# Patient Record
Sex: FEMALE | Race: WHITE | NOT HISPANIC OR LATINO | Employment: UNEMPLOYED | URBAN - METROPOLITAN AREA
[De-identification: names, ages, dates, MRNs, and addresses within clinical notes are randomized per-mention and may not be internally consistent; named-entity substitution may affect disease eponyms.]

---

## 2018-07-17 ENCOUNTER — OFFICE VISIT (OUTPATIENT)
Dept: INTERNAL MEDICINE | Facility: CLINIC | Age: 43
End: 2018-07-17

## 2018-07-17 VITALS
HEART RATE: 87 BPM | TEMPERATURE: 98.3 F | WEIGHT: 157.6 LBS | DIASTOLIC BLOOD PRESSURE: 88 MMHG | HEIGHT: 65 IN | BODY MASS INDEX: 26.26 KG/M2 | SYSTOLIC BLOOD PRESSURE: 121 MMHG | RESPIRATION RATE: 19 BRPM

## 2018-07-17 DIAGNOSIS — F90.9 ATTENTION DEFICIT HYPERACTIVITY DISORDER (ADHD), UNSPECIFIED ADHD TYPE: Primary | ICD-10-CM

## 2018-07-17 DIAGNOSIS — Z13.220 SCREENING FOR LIPOID DISORDERS: ICD-10-CM

## 2018-07-17 DIAGNOSIS — Z13.89 ENCOUNTER FOR SCREENING FOR OTHER DISORDER: ICD-10-CM

## 2018-07-17 DIAGNOSIS — Z13.29 SCREENING FOR ENDOCRINE DISORDER: ICD-10-CM

## 2018-07-17 DIAGNOSIS — J30.9 ALLERGIC RHINITIS, UNSPECIFIED CHRONICITY, UNSPECIFIED SEASONALITY, UNSPECIFIED TRIGGER: ICD-10-CM

## 2018-07-17 DIAGNOSIS — K21.9 GASTROESOPHAGEAL REFLUX DISEASE, ESOPHAGITIS PRESENCE NOT SPECIFIED: ICD-10-CM

## 2018-07-17 DIAGNOSIS — I10 ESSENTIAL HYPERTENSION: ICD-10-CM

## 2018-07-17 PROCEDURE — 99204 OFFICE O/P NEW MOD 45 MIN: CPT | Performed by: NURSE PRACTITIONER

## 2018-07-17 RX ORDER — LISINOPRIL AND HYDROCHLOROTHIAZIDE 12.5; 1 MG/1; MG/1
1 TABLET ORAL DAILY
COMMUNITY
End: 2018-07-17 | Stop reason: SDUPTHER

## 2018-07-17 RX ORDER — LISINOPRIL AND HYDROCHLOROTHIAZIDE 12.5; 1 MG/1; MG/1
1 TABLET ORAL DAILY
Qty: 30 TABLET | Refills: 6 | Status: SHIPPED | OUTPATIENT
Start: 2018-07-17 | End: 2018-10-12 | Stop reason: SDUPTHER

## 2018-07-17 RX ORDER — OMEPRAZOLE 20 MG/1
20 CAPSULE, DELAYED RELEASE ORAL DAILY
COMMUNITY
Start: 2018-05-08 | End: 2018-09-14 | Stop reason: SDUPTHER

## 2018-07-17 RX ORDER — DEXTROAMPHETAMINE SACCHARATE, AMPHETAMINE ASPARTATE, DEXTROAMPHETAMINE SULFATE AND AMPHETAMINE SULFATE 5; 5; 5; 5 MG/1; MG/1; MG/1; MG/1
20 TABLET ORAL 2 TIMES DAILY
COMMUNITY
Start: 2018-04-28 | End: 2018-08-02 | Stop reason: SDUPTHER

## 2018-07-17 RX ORDER — MONTELUKAST SODIUM 10 MG/1
10 TABLET ORAL NIGHTLY
Qty: 30 TABLET | Refills: 6 | Status: SHIPPED | OUTPATIENT
Start: 2018-07-17 | End: 2018-09-14 | Stop reason: SDUPTHER

## 2018-07-17 RX ORDER — FLUTICASONE PROPIONATE 50 MCG
2 SPRAY, SUSPENSION (ML) NASAL DAILY
Qty: 1 BOTTLE | Refills: 6 | Status: SHIPPED | OUTPATIENT
Start: 2018-07-17 | End: 2018-09-14 | Stop reason: SDUPTHER

## 2018-07-17 RX ORDER — LISINOPRIL AND HYDROCHLOROTHIAZIDE 12.5; 1 MG/1; MG/1
10-12.5 TABLET ORAL DAILY
COMMUNITY
Start: 2018-07-11 | End: 2018-07-17

## 2018-07-17 NOTE — PROGRESS NOTES
Subjective:    Reyna Weiss is a 43 y.o. female.     Chief Complaint   Patient presents with   • Establish Care     medication refills        History of Present Illness   Patient here to establish care. Her spouse is in the  and they have just moved here from [place].    Patient complains of ADHD for at least 10 years. Patient cannot focus and this is relieved with this medication. She has a months supply and has seen the psychiatrist in the past for management and prefers to continue seeing a psychiatrist..     Patient complains of GERD since 1991. Associated with reflux and heartburn. Managed with medication. Worsened with sweet foods and processed foods. She has switched to a plant based diet about 18 months ago.    Patient complains of hypertension since 2000. No chest pain or shortness of breath. She has a history of intermittent peripheral edema relieved with this medication. Patient had hoped to stop taking medicine for hypertension since changing to a plant based diet, but she has not been able to do this.     Patient complains of allergic rhinitis. Associated with nasal congestion, sore in left nostril and post nasal drainage. Worsened with moving to [place] and exposure to pollen.      Current Outpatient Prescriptions:   •  lisinopril-hydrochlorothiazide (PRINZIDE,ZESTORETIC) 10-12.5 MG per tablet, Take 1 tablet by mouth Daily., Disp: 30 tablet, Rfl: 6  •  amphetamine-dextroamphetamine (ADDERALL) 20 MG tablet, Take 20 mg by mouth 2 (Two) Times a Day., Disp: , Rfl:   •  fluticasone (FLONASE ALLERGY RELIEF) 50 MCG/ACT nasal spray, 2 sprays into each nostril Daily., Disp: 1 bottle, Rfl: 6  •  montelukast (SINGULAIR) 10 MG tablet, Take 1 tablet by mouth Every Night., Disp: 30 tablet, Rfl: 6  •  omeprazole (priLOSEC) 20 MG capsule, Take 20 mg by mouth Daily., Disp: , Rfl:      The following portions of the patient's history were reviewed and updated as appropriate: allergies, current medications, past  "family history, past medical history, past social history, past surgical history and problem list.    Review of Systems   Constitutional: Negative for chills, fatigue and fever.   HENT: Positive for congestion and postnasal drip. Negative for dental problem, mouth sores, nosebleeds, rhinorrhea, sinus pain, sinus pressure, sneezing and sore throat.         Sore in left nostril. Complains of snoring.   Eyes: Negative for pain, discharge, redness and itching.   Respiratory: Negative for cough, shortness of breath and wheezing.    Cardiovascular: Negative for chest pain, palpitations and leg swelling.        Hypertension. No LUNA, orthopnea, PND, or claudication. Peripheral edema relieved with medication.   Gastrointestinal: Negative for abdominal distention, abdominal pain, blood in stool, diarrhea, nausea and vomiting.        GERD   Endocrine: Negative for cold intolerance, heat intolerance, polydipsia and polyuria.   Genitourinary: Negative for difficulty urinating, dysuria, frequency, hematuria and urgency.   Musculoskeletal: Negative for arthralgias, gait problem, joint swelling and myalgias.   Skin: Negative for color change, rash and wound.   Allergic/Immunologic: Positive for environmental allergies.   Neurological: Negative for dizziness, syncope, weakness, light-headedness, numbness and headaches.   Hematological: Negative for adenopathy. Does not bruise/bleed easily.   Psychiatric/Behavioral: Positive for decreased concentration.        ADHD       Objective:    /88 (BP Location: Right arm, Patient Position: Sitting)   Pulse 87   Temp 98.3 °F (36.8 °C)   Resp 19   Ht 165.1 cm (65\")   Wt 71.5 kg (157 lb 9.6 oz)   BMI 26.23 kg/m²     Physical Exam   Constitutional: She is oriented to person, place, and time. She appears well-developed and well-nourished. She is cooperative. She is easily aroused.  Non-toxic appearance. She does not have a sickly appearance. She does not appear ill. No distress. "   HENT:   Head: Normocephalic and atraumatic. Head is without abrasion. Hair is normal.   Right Ear: Hearing, tympanic membrane, external ear and ear canal normal. No foreign bodies. Tympanic membrane is not perforated and not erythematous.   Left Ear: Hearing, tympanic membrane, external ear and ear canal normal. No foreign bodies. Tympanic membrane is not perforated and not erythematous.   Nose: Mucosal edema and rhinorrhea present. No septal deviation. No epistaxis.  No foreign bodies.   Mouth/Throat: Oropharynx is clear and moist. No oral lesions. Normal dentition.   Left nasal mucosa inflamed   Eyes: Lids are normal. Pupils are equal, round, and reactive to light. Right eye exhibits no discharge. Left eye exhibits no discharge. Right conjunctiva is not injected. Left conjunctiva is not injected. No scleral icterus.   Neck: Normal range of motion and full passive range of motion without pain. Neck supple. No edema and normal range of motion present. No thyroid mass and no thyromegaly present.   Cardiovascular: Normal rate, regular rhythm and normal heart sounds.  Exam reveals no gallop and no friction rub.    No murmur heard.  Pulmonary/Chest: Effort normal and breath sounds normal. No accessory muscle usage. She has no rhonchi. She has no rales. She exhibits no tenderness.   Abdominal: Soft. Bowel sounds are normal. She exhibits no distension. There is no hepatosplenomegaly or hepatomegaly. There is no tenderness. There is no CVA tenderness.   Musculoskeletal: Normal range of motion. She exhibits no edema, tenderness or deformity.   Lymphadenopathy:     She has no cervical adenopathy.   Neurological: She is alert, oriented to person, place, and time and easily aroused. She has normal reflexes. No cranial nerve deficit. Coordination normal.   Muscle strength 5/5 and equal throughout.   Skin: Skin is warm, dry and intact. No abrasion and no rash noted. She is not diaphoretic. No cyanosis or erythema. Nails show  no clubbing.   Psychiatric: She has a normal mood and affect. Her speech is normal and behavior is normal.   Nursing note and vitals reviewed.      Assessment/Plan:    Reyna was seen today for establish care.    Diagnoses and all orders for this visit:    Attention deficit hyperactivity disorder (ADHD), unspecified ADHD type  -     Ambulatory Referral to Psychiatry    Allergic rhinitis, unspecified chronicity, unspecified seasonality, unspecified trigger  -     montelukast (SINGULAIR) 10 MG tablet; Take 1 tablet by mouth Every Night.  -     fluticasone (FLONASE ALLERGY RELIEF) 50 MCG/ACT nasal spray; 2 sprays into each nostril Daily.    Encounter for screening for other disorder    Screening for endocrine disorder  -     T4; Future  -     TSH; Future    Screening for lipoid disorders  -     Lipid Panel; Future    Essential hypertension  -     Comprehensive Metabolic Panel; Future  -     CBC & Differential; Future  -     MicroAlbumin, Urine, Random - Urine, Clean Catch; Future    Other orders  -     lisinopril-hydrochlorothiazide (PRINZIDE,ZESTORETIC) 10-12.5 MG per tablet; Take 1 tablet by mouth Daily.        Return if symptoms worsen or fail to improve, schedule physical.

## 2018-07-31 ENCOUNTER — TELEPHONE (OUTPATIENT)
Dept: INTERNAL MEDICINE | Facility: CLINIC | Age: 43
End: 2018-07-31

## 2018-07-31 NOTE — TELEPHONE ENCOUNTER
Her appointment is 8/22/2018 due to  insurance. Limited providers take this insurance. I saw her on 7/17/2018 and she had stated she had one month supply of ADHD medicine. I will need her to make an appointment so ADHD can be evaluated, UDS performed, CSA and Efe. I have sent in Zoloft to pharmacy.

## 2018-07-31 NOTE — TELEPHONE ENCOUNTER
----- Message from Sophie Balderrama sent at 7/30/2018  3:07 PM EDT -----  [phone]    PT NEEDS REFILL OF ADDERALL-SHE WAS REFERRED OUT TO PSYCH BUT HER APPT IS OUT A MONTH-SHE NEEDS REFILL NOW.  CAN YOU GET A DR TO SIGN OFF ON IT?  SHE DOES NOT KNOW WHAT ELSE TO DO    SHE WAS CRYING ON THE PHONE-SHE SAID SHE WANTS TO GO BACK ON ANTIDEPRESSANTS AND WANTED PSYCH TO PRESCRIBE THAT TOO-CAN YOU GIVE HER ANYTHING FOR THAT?  SHE WAS ON ZOLOFT OR PROZAC BEFORE.  OR CNA YOU GET INTO PSYCH SOONER?    [facility] [address]

## 2018-08-02 ENCOUNTER — OFFICE VISIT (OUTPATIENT)
Dept: INTERNAL MEDICINE | Facility: CLINIC | Age: 43
End: 2018-08-02

## 2018-08-02 VITALS
DIASTOLIC BLOOD PRESSURE: 84 MMHG | RESPIRATION RATE: 18 BRPM | TEMPERATURE: 97.8 F | HEIGHT: 65 IN | WEIGHT: 161.4 LBS | BODY MASS INDEX: 26.89 KG/M2 | SYSTOLIC BLOOD PRESSURE: 125 MMHG | HEART RATE: 73 BPM

## 2018-08-02 DIAGNOSIS — F32.A DEPRESSION, UNSPECIFIED DEPRESSION TYPE: ICD-10-CM

## 2018-08-02 DIAGNOSIS — F90.9 ATTENTION DEFICIT HYPERACTIVITY DISORDER (ADHD), UNSPECIFIED ADHD TYPE: Primary | ICD-10-CM

## 2018-08-02 PROCEDURE — 99214 OFFICE O/P EST MOD 30 MIN: CPT | Performed by: NURSE PRACTITIONER

## 2018-08-02 RX ORDER — DEXTROAMPHETAMINE SACCHARATE, AMPHETAMINE ASPARTATE, DEXTROAMPHETAMINE SULFATE AND AMPHETAMINE SULFATE 5; 5; 5; 5 MG/1; MG/1; MG/1; MG/1
20 TABLET ORAL 2 TIMES DAILY
Qty: 60 TABLET | Refills: 0 | Status: SHIPPED | OUTPATIENT
Start: 2018-08-02 | End: 2018-08-02 | Stop reason: SDUPTHER

## 2018-08-02 RX ORDER — DEXTROAMPHETAMINE SACCHARATE, AMPHETAMINE ASPARTATE, DEXTROAMPHETAMINE SULFATE AND AMPHETAMINE SULFATE 5; 5; 5; 5 MG/1; MG/1; MG/1; MG/1
20 TABLET ORAL 2 TIMES DAILY
Qty: 60 TABLET | Refills: 0 | Status: SHIPPED | OUTPATIENT
Start: 2018-08-02

## 2018-08-09 ENCOUNTER — TELEPHONE (OUTPATIENT)
Dept: INTERNAL MEDICINE | Facility: CLINIC | Age: 43
End: 2018-08-09

## 2018-08-09 NOTE — TELEPHONE ENCOUNTER
----- Message from STEFF Dasilva sent at 8/9/2018  8:14 AM EDT -----  Please inform patient urine drug screen positive for THC and adderall will not be prescribed any more.

## 2018-08-09 NOTE — TELEPHONE ENCOUNTER
Patient states that her urine tested positive for THC because she uses CBD oil. Patient wants to know if this makes difference.

## 2018-09-14 DIAGNOSIS — J30.9 ALLERGIC RHINITIS, UNSPECIFIED CHRONICITY, UNSPECIFIED SEASONALITY, UNSPECIFIED TRIGGER: ICD-10-CM

## 2018-09-14 DIAGNOSIS — K21.00 GASTROESOPHAGEAL REFLUX DISEASE WITH ESOPHAGITIS: Primary | ICD-10-CM

## 2018-09-14 RX ORDER — MONTELUKAST SODIUM 10 MG/1
10 TABLET ORAL NIGHTLY
Qty: 30 TABLET | Refills: 6 | Status: SHIPPED | OUTPATIENT
Start: 2018-09-14 | End: 2018-10-12 | Stop reason: SDUPTHER

## 2018-09-14 RX ORDER — FLUTICASONE PROPIONATE 50 MCG
2 SPRAY, SUSPENSION (ML) NASAL DAILY
Qty: 1 BOTTLE | Refills: 6 | Status: SHIPPED | OUTPATIENT
Start: 2018-09-14 | End: 2018-10-12 | Stop reason: SDUPTHER

## 2018-09-14 RX ORDER — OMEPRAZOLE 20 MG/1
20 CAPSULE, DELAYED RELEASE ORAL DAILY
Qty: 20 CAPSULE | Refills: 3 | Status: SHIPPED | OUTPATIENT
Start: 2018-09-14 | End: 2018-10-12 | Stop reason: SDUPTHER

## 2018-10-12 DIAGNOSIS — K21.00 GASTROESOPHAGEAL REFLUX DISEASE WITH ESOPHAGITIS: ICD-10-CM

## 2018-10-12 RX ORDER — LISINOPRIL AND HYDROCHLOROTHIAZIDE 12.5; 1 MG/1; MG/1
1 TABLET ORAL DAILY
Qty: 30 TABLET | Refills: 6 | Status: SHIPPED | OUTPATIENT
Start: 2018-10-12 | End: 2018-10-15 | Stop reason: SDUPTHER

## 2018-10-12 RX ORDER — MONTELUKAST SODIUM 10 MG/1
10 TABLET ORAL NIGHTLY
Qty: 30 TABLET | Refills: 6 | Status: SHIPPED | OUTPATIENT
Start: 2018-10-12 | End: 2018-10-15 | Stop reason: SDUPTHER

## 2018-10-12 RX ORDER — FLUTICASONE PROPIONATE 50 MCG
2 SPRAY, SUSPENSION (ML) NASAL DAILY
Qty: 1 BOTTLE | Refills: 6 | Status: SHIPPED | OUTPATIENT
Start: 2018-10-12 | End: 2018-10-15 | Stop reason: SDUPTHER

## 2018-10-12 RX ORDER — OMEPRAZOLE 20 MG/1
20 CAPSULE, DELAYED RELEASE ORAL DAILY
Qty: 20 CAPSULE | Refills: 3 | Status: SHIPPED | OUTPATIENT
Start: 2018-10-12 | End: 2018-10-15 | Stop reason: SDUPTHER

## 2018-10-15 DIAGNOSIS — K21.00 GASTROESOPHAGEAL REFLUX DISEASE WITH ESOPHAGITIS: ICD-10-CM

## 2018-10-15 RX ORDER — LISINOPRIL AND HYDROCHLOROTHIAZIDE 12.5; 1 MG/1; MG/1
1 TABLET ORAL DAILY
Qty: 30 TABLET | Refills: 6 | Status: SHIPPED | OUTPATIENT
Start: 2018-10-15 | End: 2019-01-04 | Stop reason: DRUGHIGH

## 2018-10-15 RX ORDER — OMEPRAZOLE 20 MG/1
20 CAPSULE, DELAYED RELEASE ORAL DAILY
Qty: 20 CAPSULE | Refills: 3 | Status: SHIPPED | OUTPATIENT
Start: 2018-10-15 | End: 2019-01-04 | Stop reason: SDUPTHER

## 2018-10-15 RX ORDER — MONTELUKAST SODIUM 10 MG/1
10 TABLET ORAL NIGHTLY
Qty: 30 TABLET | Refills: 6 | Status: SHIPPED | OUTPATIENT
Start: 2018-10-15

## 2018-10-15 RX ORDER — FLUTICASONE PROPIONATE 50 MCG
2 SPRAY, SUSPENSION (ML) NASAL DAILY
Qty: 1 BOTTLE | Refills: 6 | Status: SHIPPED | OUTPATIENT
Start: 2018-10-15 | End: 2020-09-21

## 2019-01-04 ENCOUNTER — OFFICE VISIT (OUTPATIENT)
Dept: INTERNAL MEDICINE | Facility: CLINIC | Age: 44
End: 2019-01-04

## 2019-01-04 VITALS
TEMPERATURE: 97.8 F | RESPIRATION RATE: 18 BRPM | BODY MASS INDEX: 28.32 KG/M2 | HEART RATE: 74 BPM | DIASTOLIC BLOOD PRESSURE: 98 MMHG | SYSTOLIC BLOOD PRESSURE: 140 MMHG | WEIGHT: 170 LBS | HEIGHT: 65 IN

## 2019-01-04 DIAGNOSIS — K21.00 GASTROESOPHAGEAL REFLUX DISEASE WITH ESOPHAGITIS: ICD-10-CM

## 2019-01-04 DIAGNOSIS — G56.03 BILATERAL CARPAL TUNNEL SYNDROME: ICD-10-CM

## 2019-01-04 DIAGNOSIS — I10 ESSENTIAL HYPERTENSION: Primary | ICD-10-CM

## 2019-01-04 DIAGNOSIS — N95.1 SYMPTOMS, SUCH AS FLUSHING, SLEEPLESSNESS, HEADACHE, LACK OF CONCENTRATION, ASSOCIATED WITH THE MENOPAUSE: ICD-10-CM

## 2019-01-04 LAB
ALBUMIN SERPL-MCNC: 4.25 G/DL (ref 3.2–4.8)
ALBUMIN/GLOB SERPL: 2 G/DL (ref 1.5–2.5)
ALP SERPL-CCNC: 57 U/L (ref 25–100)
ALT SERPL W P-5'-P-CCNC: 17 U/L (ref 7–40)
ANION GAP SERPL CALCULATED.3IONS-SCNC: 5 MMOL/L (ref 3–11)
AST SERPL-CCNC: 18 U/L (ref 0–33)
BILIRUB SERPL-MCNC: 0.3 MG/DL (ref 0.3–1.2)
BUN BLD-MCNC: 11 MG/DL (ref 9–23)
BUN/CREAT SERPL: 16.9 (ref 7–25)
CALCIUM SPEC-SCNC: 9.3 MG/DL (ref 8.7–10.4)
CHLORIDE SERPL-SCNC: 106 MMOL/L (ref 99–109)
CO2 SERPL-SCNC: 27 MMOL/L (ref 20–31)
CREAT BLD-MCNC: 0.65 MG/DL (ref 0.6–1.3)
FSH SERPL-ACNC: 4.7 MIU/ML
GFR SERPL CREATININE-BSD FRML MDRD: 99 ML/MIN/1.73
GLOBULIN UR ELPH-MCNC: 2.2 GM/DL
GLUCOSE BLD-MCNC: 106 MG/DL (ref 70–100)
POTASSIUM BLD-SCNC: 4.3 MMOL/L (ref 3.5–5.5)
PROT SERPL-MCNC: 6.4 G/DL (ref 5.7–8.2)
SODIUM BLD-SCNC: 138 MMOL/L (ref 132–146)
T4 FREE SERPL-MCNC: 1.12 NG/DL (ref 0.89–1.76)
TSH SERPL DL<=0.05 MIU/L-ACNC: 1.35 MIU/ML (ref 0.35–5.35)

## 2019-01-04 PROCEDURE — 83001 ASSAY OF GONADOTROPIN (FSH): CPT | Performed by: NURSE PRACTITIONER

## 2019-01-04 PROCEDURE — 99214 OFFICE O/P EST MOD 30 MIN: CPT | Performed by: NURSE PRACTITIONER

## 2019-01-04 PROCEDURE — 80053 COMPREHEN METABOLIC PANEL: CPT | Performed by: NURSE PRACTITIONER

## 2019-01-04 PROCEDURE — 36415 COLL VENOUS BLD VENIPUNCTURE: CPT | Performed by: NURSE PRACTITIONER

## 2019-01-04 PROCEDURE — 84443 ASSAY THYROID STIM HORMONE: CPT | Performed by: NURSE PRACTITIONER

## 2019-01-04 PROCEDURE — 84439 ASSAY OF FREE THYROXINE: CPT | Performed by: NURSE PRACTITIONER

## 2019-01-04 RX ORDER — OMEPRAZOLE 20 MG/1
20 CAPSULE, DELAYED RELEASE ORAL DAILY
Qty: 30 CAPSULE | Refills: 3 | Status: SHIPPED | OUTPATIENT
Start: 2019-01-04 | End: 2019-01-04 | Stop reason: DRUGHIGH

## 2019-01-04 RX ORDER — OMEPRAZOLE 40 MG/1
40 CAPSULE, DELAYED RELEASE ORAL DAILY
Qty: 30 CAPSULE | Refills: 3 | Status: SHIPPED | OUTPATIENT
Start: 2019-01-04 | End: 2019-06-22 | Stop reason: SDUPTHER

## 2019-01-04 RX ORDER — LISINOPRIL AND HYDROCHLOROTHIAZIDE 20; 12.5 MG/1; MG/1
1 TABLET ORAL DAILY
Qty: 90 TABLET | Refills: 1 | Status: SHIPPED | OUTPATIENT
Start: 2019-01-04 | End: 2019-08-13 | Stop reason: SDUPTHER

## 2019-01-04 NOTE — PROGRESS NOTES
Subjective:    Reyna Weiss is a 43 y.o. female.     Chief Complaint   Patient presents with   • Carpal Tunnel     right xseveral years   • Numbness     right thumb x 3 months        History of Present Illness     Patient complains of carpal tunnel pain bilaterally for several years. Worse on right hand-first 3 digits are numb constantly x 3 months. No relief with brace x 2 years. Patient interested in surgical referral at this time. No alleviation identified. Worsened with use.    Patient complains of hypertension since age 15. Worsened with recent weight gain. She checks blood pressure randomly at home and diastolic has been in 90 range. Patient feels like Zoloft has caused her weight gain. No chest pain, shortness of breath or leg swelling.    She is seeing a psychiatrist every month for ADHD and depression. She states psychiatrist may be changing medication due to weight gain.    Patient complains of irregular menses, hot flashes and vaginal dryness for 2 months. Patient inquiring about menopause. Patient does not know mother's menstrual history due to surgery.    Patient complains of GERD for years of duration. Worsened recently and patient requesting gastroenterology referral.    Current Outpatient Medications:   •  amphetamine-dextroamphetamine (ADDERALL) 20 MG tablet, Take 1 tablet by mouth 2 (Two) Times a Day., Disp: 60 tablet, Rfl: 0  •  fluticasone (FLONASE ALLERGY RELIEF) 50 MCG/ACT nasal spray, 2 sprays into the nostril(s) as directed by provider Daily., Disp: 1 bottle, Rfl: 6  •  montelukast (SINGULAIR) 10 MG tablet, Take 1 tablet by mouth Every Night., Disp: 30 tablet, Rfl: 6  •  sertraline (ZOLOFT) 50 MG tablet, Take 1 tablet by mouth Daily. (Patient taking differently: Take 100 mg by mouth Daily.), Disp: 30 tablet, Rfl: 0  •  lisinopril-hydrochlorothiazide (PRINZIDE,ZESTORETIC) 20-12.5 MG per tablet, Take 1 tablet by mouth Daily., Disp: 90 tablet, Rfl: 1  •  omeprazole (PRILOSEC) 40 MG capsule,  "Take 1 capsule by mouth Daily., Disp: 30 capsule, Rfl: 3     The following portions of the patient's history were reviewed and updated as appropriate: allergies, current medications, past family history, past medical history, past social history, past surgical history and problem list.    Review of Systems   Constitutional: Negative for chills, fatigue and fever.   HENT: Negative for congestion, dental problem, mouth sores, nosebleeds, postnasal drip, rhinorrhea, sinus pressure, sinus pain, sneezing and sore throat.    Eyes: Negative for pain, discharge, redness and itching.   Respiratory: Negative for cough, shortness of breath and wheezing.    Cardiovascular: Negative for chest pain, palpitations and leg swelling.        No LUNA, orthopnea, PND, or claudication. HTN   Gastrointestinal: Negative for abdominal distention, abdominal pain, blood in stool, diarrhea, nausea and vomiting.        GERD   Endocrine: Negative for cold intolerance, heat intolerance, polydipsia and polyuria.   Genitourinary: Positive for menstrual problem. Negative for difficulty urinating, dysuria, frequency, hematuria and urgency.        Vaginal dryness, hot flashes, irregular menses   Musculoskeletal: Negative for arthralgias, gait problem, joint swelling and myalgias.        Carpal tunnel bilaterally R>L   Skin: Negative for color change, rash and wound.   Allergic/Immunologic: Positive for environmental allergies.   Neurological: Negative for dizziness, syncope, weakness, light-headedness, numbness and headaches.   Hematological: Negative for adenopathy. Does not bruise/bleed easily.   Psychiatric/Behavioral: Positive for decreased concentration and dysphoric mood. Negative for self-injury and suicidal ideas.        ADHD. Depression       Objective:    /98 (BP Location: Right arm, Patient Position: Sitting, Cuff Size: Adult)   Pulse 74   Temp 97.8 °F (36.6 °C)   Resp 18   Ht 165.1 cm (65\")   Wt 77.1 kg (170 lb)   BMI 28.29 " kg/m²     Physical Exam   Constitutional: She is oriented to person, place, and time. She appears well-developed and well-nourished. She is cooperative. She is easily aroused.  Non-toxic appearance. She does not have a sickly appearance. She does not appear ill. No distress.   HENT:   Head: Normocephalic and atraumatic. Head is without abrasion. Hair is normal.   Right Ear: Tympanic membrane, external ear and ear canal normal. No foreign bodies. Tympanic membrane is not perforated and not erythematous.   Left Ear: Tympanic membrane, external ear and ear canal normal. No foreign bodies. Tympanic membrane is not perforated and not erythematous.   Nose: Nose normal. No mucosal edema, rhinorrhea or septal deviation. No epistaxis.  No foreign bodies.   Mouth/Throat: Oropharynx is clear and moist. No oral lesions. Normal dentition.   Eyes: Conjunctivae and lids are normal. Pupils are equal, round, and reactive to light. Right eye exhibits no discharge. Left eye exhibits no discharge. Right conjunctiva is not injected. Left conjunctiva is not injected. No scleral icterus.   Neck: Normal range of motion and full passive range of motion without pain. Neck supple. No edema and normal range of motion present. No thyroid mass and no thyromegaly present.   Cardiovascular: Normal rate, regular rhythm and normal heart sounds. Exam reveals no gallop and no friction rub.   No murmur heard.  Pulmonary/Chest: Effort normal and breath sounds normal. No accessory muscle usage. She has no rhonchi. She has no rales. She exhibits no tenderness.   Abdominal: Soft. Bowel sounds are normal. She exhibits no distension. There is no hepatosplenomegaly or hepatomegaly. There is no tenderness. There is no CVA tenderness.   Musculoskeletal: She exhibits no edema or deformity.        Right wrist: She exhibits decreased range of motion and tenderness.        Left wrist: She exhibits decreased range of motion and tenderness.   Lymphadenopathy:     She  has no cervical adenopathy.   Neurological: She is alert, oriented to person, place, and time and easily aroused. Coordination normal.   Skin: Skin is warm, dry and intact. No abrasion and no rash noted. She is not diaphoretic. No cyanosis or erythema. Nails show no clubbing.   Psychiatric: Her mood appears anxious.   Nursing note and vitals reviewed.      Assessment/Plan:    Reyna was seen today for carpal tunnel and numbness.    Diagnoses and all orders for this visit:    Essential hypertension  -     lisinopril-hydrochlorothiazide (PRINZIDE,ZESTORETIC) 20-12.5 MG per tablet; Take 1 tablet by mouth Daily.  -     Comprehensive Metabolic Panel    Gastroesophageal reflux disease with esophagitis  -     Discontinue: omeprazole (priLOSEC) 20 MG capsule; Take 1 capsule by mouth Daily.  -     Ambulatory Referral to Gastroenterology  -     omeprazole (PRILOSEC) 40 MG capsule; Take 1 capsule by mouth Daily.    Symptoms, such as flushing, sleeplessness, headache, lack of concentration, associated with the menopause  -     T4, Free  -     TSH  -     Follicle Stimulating Hormone    Bilateral carpal tunnel syndrome  -     Ambulatory Referral to Hand Surgery        Return if symptoms worsen or fail to improve, schedule physical f/u 1 month.

## 2019-01-07 ENCOUNTER — TELEPHONE (OUTPATIENT)
Dept: INTERNAL MEDICINE | Facility: CLINIC | Age: 44
End: 2019-01-07

## 2019-01-07 NOTE — TELEPHONE ENCOUNTER
----- Message from STEFF Dasilva sent at 1/4/2019  3:34 PM EST -----  Please inform patient thyroid labs are normal. FSH does not indicate menopause, but she may be experiencing perimenopause. Metabolic panel stable with elevated glucose and can be related to a non fasting state.

## 2019-06-22 DIAGNOSIS — K21.00 GASTROESOPHAGEAL REFLUX DISEASE WITH ESOPHAGITIS: ICD-10-CM

## 2019-06-24 RX ORDER — OMEPRAZOLE 40 MG/1
CAPSULE, DELAYED RELEASE ORAL
Qty: 30 CAPSULE | Refills: 0 | Status: SHIPPED | OUTPATIENT
Start: 2019-06-24 | End: 2019-07-29 | Stop reason: SDUPTHER

## 2019-07-29 DIAGNOSIS — K21.00 GASTROESOPHAGEAL REFLUX DISEASE WITH ESOPHAGITIS: ICD-10-CM

## 2019-07-29 RX ORDER — OMEPRAZOLE 40 MG/1
CAPSULE, DELAYED RELEASE ORAL
Qty: 30 CAPSULE | Refills: 0 | Status: SHIPPED | OUTPATIENT
Start: 2019-07-29 | End: 2019-08-06

## 2019-08-05 NOTE — PROGRESS NOTES
Chief Complaint   Patient presents with   • Annual Exam     fasting       Subjective   Physical    History of Present Illness   Physical    Patient complains of chronic hypertension since age 15. Improved with recent weight loss after stopping Zoloft. Patient denies chest pain, palpitations, LUNA, orthopnea or swelling.      She is seeing a psychiatrist every month for chronic ADHD and depression. She states she has decided to stop Zoloft with psychiatrist awareness due to weight gain as side effect. Noted with medical record review that Borderline Personality disorder is noted in history of diagnoses.     Patient complains of chronic GERD for years of duration. Worsened recently and patient requesting gastroenterology referral. Patient requests change from 40 mg daily dosage to 20 mg BID dosage so she can wean off this medicine, but she will discuss with gastro. Patient states she is due colonoscopy as well.     Patient complains of chronic intermittent numbness on right side of body. She believes this is related to cervical DDD. Numbness radiates from neck to finger tips.    She complains of palpable lump at right breast at 11:00 x 2 years and new pain at 3:00 at right areola-she refuses any mammogram. Discussed mammogram may uncover breast cancer and patient states mammogram is not a definitive test.     Patient complains of chronic right sided back pain with sciatica. This is worsened with PMS, prolonged sitting and exercise. No treatment with analgesics or other medicines as she does not want to take many medicines.    She complains of headaches once a month for 3 days at base of neck that radiates tingly sensation on right side of head and face. No light or sound sensitivity, but she does get nausea without vomiting. Certain head position give relief, but she cannot maintain these positions. She states she has always had these headaches.    Chronic allergic rhinitis associated with nasal congestion is stable.  Worsened by season changes. Managed with SInulair and FLonase    Patient complains of chronic snoring with daytime fatigue, no matter amount of sleep. She is interested in sleep study.   Reyna Weiss is a 44 y.o. female.   None  Are you currently seeing any other doctors or specialists?  Psychiatrist  Are you currently taking any OTC medications or herbal medications?   None  Sleep: 6-7 hours  Diet: balanced  Exercise: yes    Most recent colonoscopy: 2015 and is on schedule with gastro  Most recent mammogram: does not do by choice-pain in right breast for years, she will agree to US  Most recent pap smear: yes-due in 2020  First day of last menses: 7/24/2019    Regular dental visits: current  Regular eye exams: current    The following portions of the patient's history were reviewed and updated as appropriate: allergies, current medications, past family history, past medical history, past social history, past surgical history and problem list.    No Known Allergies  Social History     Tobacco Use   • Smoking status: Never Smoker   • Smokeless tobacco: Never Used   Substance Use Topics   • Alcohol use: No     History reviewed. No pertinent surgical history.  History reviewed. No pertinent family history.      Current Outpatient Medications:   •  amphetamine-dextroamphetamine (ADDERALL) 20 MG tablet, Take 1 tablet by mouth 2 (Two) Times a Day. (Patient taking differently: Take 20 mg by mouth Daily.), Disp: 60 tablet, Rfl: 0  •  amphetamine-dextroamphetamine XR (ADDERALL XR) 20 MG 24 hr capsule, Take 20 mg by mouth Every Morning, Disp: , Rfl: 0  •  fluticasone (FLONASE ALLERGY RELIEF) 50 MCG/ACT nasal spray, 2 sprays into the nostril(s) as directed by provider Daily., Disp: 1 bottle, Rfl: 6  •  lisinopril-hydrochlorothiazide (PRINZIDE,ZESTORETIC) 20-12.5 MG per tablet, Take 1 tablet by mouth Daily., Disp: 90 tablet, Rfl: 1  •  montelukast (SINGULAIR) 10 MG tablet, Take 1 tablet by mouth Every Night., Disp: 30 tablet,  Rfl: 6  •  omeprazole (PRILOSEC) 20 MG capsule, Take 1 capsule by mouth 2 (Two) Times a Day., Disp: 60 capsule, Rfl: 6  •  SUMAtriptan (IMITREX) 25 MG tablet, Take one tablet at onset of headache. May repeat dose one time in 2 hours if headache not relieved., Disp: 9 tablet, Rfl: 2    Patient Active Problem List   Diagnosis   • Attention deficit hyperactivity disorder (ADHD)   • Allergic rhinitis   • Essential hypertension   • Gastroesophageal reflux disease   • Depression   • Snoring   • Numbness   • Nonintractable episodic headache   • Right sided sciatica   • Breast pain   • Breast lump on right side at 11 o'clock position       Review of Systems   Constitutional: Negative for chills, fatigue and fever.   HENT: Positive for congestion. Negative for dental problem, mouth sores, nosebleeds, postnasal drip, rhinorrhea, sinus pressure, sneezing and sore throat.         Snoring lightly   Eyes: Negative for pain, discharge, redness and itching.   Respiratory: Negative for cough, shortness of breath and wheezing.    Cardiovascular: Negative for chest pain, palpitations and leg swelling.        No LUNA, orthopnea, PND, or claudication.   Gastrointestinal: Negative for abdominal distention, abdominal pain, blood in stool, diarrhea, nausea and vomiting.   Endocrine: Negative for cold intolerance, heat intolerance, polydipsia and polyuria.   Genitourinary: Positive for breast lump and breast pain. Negative for breast discharge, urinary incontinence, decreased libido, decreased urine volume, difficulty urinating, dyspareunia, dysuria, flank pain, frequency, genital sores, hematuria, menstrual problem, pelvic pain, urgency, vaginal bleeding, vaginal discharge and vaginal pain.        Right breast pain   Musculoskeletal: Positive for back pain and neck pain. Negative for arthralgias, gait problem, joint swelling and myalgias.   Skin: Negative for color change and rash.        No wound.   Allergic/Immunologic: Positive for  environmental allergies.   Neurological: Positive for numbness and headache. Negative for dizziness, syncope, weakness and light-headedness.   Hematological: Negative for adenopathy. Does not bruise/bleed easily.       Objective   Vitals:    08/06/19 0930   BP: 138/70   Pulse: 78   Resp: 20   Temp: 98.3 °F (36.8 °C)   SpO2: 98%     Physical Exam   Constitutional: She is oriented to person, place, and time. She appears well-developed and well-nourished. She is active and cooperative. She is easily aroused.  Non-toxic appearance. She does not have a sickly appearance. She does not appear ill. No distress. She appears overweight. She is not obese.She is not morbidly obese.  HENT:   Head: Normocephalic and atraumatic. Head is without abrasion. Hair is normal.   Right Ear: Hearing, tympanic membrane, external ear and ear canal normal. No foreign bodies. Tympanic membrane is not perforated and not erythematous.   Left Ear: Hearing, tympanic membrane, external ear and ear canal normal. No foreign bodies. Tympanic membrane is not perforated and not erythematous.   Nose: Nose normal. No mucosal edema, rhinorrhea or septal deviation. No epistaxis.  No foreign bodies.   Mouth/Throat: Uvula is midline and oropharynx is clear and moist. No oral lesions. Normal dentition.   Eyes: Conjunctivae and lids are normal. Pupils are equal, round, and reactive to light. Right eye exhibits no discharge. Left eye exhibits no discharge. Right conjunctiva is not injected. Left conjunctiva is not injected. No scleral icterus.   Neck: Normal range of motion and full passive range of motion without pain. Neck supple. No edema and normal range of motion present. No thyroid mass and no thyromegaly present.   Cardiovascular: Normal rate, regular rhythm and normal heart sounds. Exam reveals no gallop and no friction rub.   No murmur heard.  Pulmonary/Chest: Effort normal and breath sounds normal. No accessory muscle usage. She has no rhonchi. She has  no rales. Chest wall is not dull to percussion. She exhibits no mass, no tenderness, no bony tenderness, no laceration, no crepitus, no edema, no deformity, no swelling and no retraction. Right breast exhibits mass and tenderness. Right breast exhibits no inverted nipple, no nipple discharge and no skin change. Left breast exhibits no inverted nipple, no mass, no nipple discharge, no skin change and no tenderness. Breasts are symmetrical. There is no breast swelling.   0.5 cm palpable lump at 11:00 right breast and tenderness at that lump and at 3:00 at areola     Abdominal: Soft. Bowel sounds are normal. She exhibits no distension. There is no hepatosplenomegaly or hepatomegaly. There is no tenderness. There is no CVA tenderness.   Musculoskeletal: Normal range of motion. She exhibits no edema, tenderness or deformity.   Lymphadenopathy:     She has no cervical adenopathy.   Neurological: She is alert, oriented to person, place, and time and easily aroused. She has normal reflexes. No cranial nerve deficit. Coordination normal.   Muscle strength 5/5 and equal throughout.   Skin: Skin is warm, dry and intact. No abrasion and no rash noted. She is not diaphoretic. No cyanosis or erythema. Nails show no clubbing.   Psychiatric: She has a normal mood and affect. Her speech is normal and behavior is normal.   Nursing note and vitals reviewed.        Results for orders placed or performed in visit on 01/04/19   T4, Free   Result Value Ref Range    Free T4 1.12 0.89 - 1.76 ng/dL   TSH   Result Value Ref Range    TSH 1.349 0.350 - 5.350 mIU/mL   Comprehensive Metabolic Panel   Result Value Ref Range    Glucose 106 (H) 70 - 100 mg/dL    BUN 11 9 - 23 mg/dL    Creatinine 0.65 0.60 - 1.30 mg/dL    Sodium 138 132 - 146 mmol/L    Potassium 4.3 3.5 - 5.5 mmol/L    Chloride 106 99 - 109 mmol/L    CO2 27.0 20.0 - 31.0 mmol/L    Calcium 9.3 8.7 - 10.4 mg/dL    Total Protein 6.4 5.7 - 8.2 g/dL    Albumin 4.25 3.20 - 4.80 g/dL    ALT  (SGPT) 17 7 - 40 U/L    AST (SGOT) 18 0 - 33 U/L    Alkaline Phosphatase 57 25 - 100 U/L    Total Bilirubin 0.3 0.3 - 1.2 mg/dL    eGFR Non African Amer 99 >60 mL/min/1.73    Globulin 2.2 gm/dL    A/G Ratio 2.0 1.5 - 2.5 g/dL    BUN/Creatinine Ratio 16.9 7.0 - 25.0    Anion Gap 5.0 3.0 - 11.0 mmol/L   Follicle Stimulating Hormone   Result Value Ref Range    FSH 4.70 mIU/mL       Assessment/Plan   Reyna was seen today for annual exam.    Diagnoses and all orders for this visit:    Encounter for preventive health examination    Screen for colon cancer  -     Ambulatory Referral to Gastroenterology    Gastroesophageal reflux disease with esophagitis  -     omeprazole (PRILOSEC) 20 MG capsule; Take 1 capsule by mouth 2 (Two) Times a Day.    Snoring  -     Ambulatory Referral to Sleep Medicine    Numbness  -     Ambulatory Referral to Neurology    Nonintractable episodic headache, unspecified headache type  -     SUMAtriptan (IMITREX) 25 MG tablet; Take one tablet at onset of headache. May repeat dose one time in 2 hours if headache not relieved.    Encounter for screening for other disorder  -     T4, Free  -     TSH  -     CBC & Differential  -     Comprehensive Metabolic Panel  -     Lipid Panel  -     CBC Auto Differential    Right sided sciatica  -     Ambulatory Referral to Orthopedic Surgery    Breast pain    Breast lump on right side at 11 o'clock position  -     US Breast Right Limited    Essential hypertension  Continue Zestoretic  Allergic rhinitis, unspecified seasonality, unspecified trigger  Continue Flonase and Singulair    ADHD/depression-continue psychiatric follow and medicines per their recommendation           Patient education discussed during this visit:  - avoidance of texting while driving and the need for wearing seatbelt  - use of sunscreen  - healthy sleep habits and appropriate amount of sleep  - H2O consumption, well-balanced diet  - exercise routine which includes at least 150 minutes of  cardio per week + muscle strengthening exercises  - immunizations including annual flu vaccination      Return in about 1 year (around 8/6/2020), or if symptoms worsen or fail to improve.

## 2019-08-06 ENCOUNTER — TRANSCRIBE ORDERS (OUTPATIENT)
Dept: INTERNAL MEDICINE | Facility: CLINIC | Age: 44
End: 2019-08-06

## 2019-08-06 ENCOUNTER — OFFICE VISIT (OUTPATIENT)
Dept: INTERNAL MEDICINE | Facility: CLINIC | Age: 44
End: 2019-08-06

## 2019-08-06 VITALS
SYSTOLIC BLOOD PRESSURE: 138 MMHG | TEMPERATURE: 98.3 F | OXYGEN SATURATION: 98 % | HEART RATE: 78 BPM | HEIGHT: 65 IN | WEIGHT: 159 LBS | BODY MASS INDEX: 26.49 KG/M2 | DIASTOLIC BLOOD PRESSURE: 70 MMHG | RESPIRATION RATE: 20 BRPM

## 2019-08-06 DIAGNOSIS — R51.9 NONINTRACTABLE EPISODIC HEADACHE, UNSPECIFIED HEADACHE TYPE: ICD-10-CM

## 2019-08-06 DIAGNOSIS — J30.9 ALLERGIC RHINITIS, UNSPECIFIED SEASONALITY, UNSPECIFIED TRIGGER: ICD-10-CM

## 2019-08-06 DIAGNOSIS — F32.A DEPRESSION, UNSPECIFIED DEPRESSION TYPE: ICD-10-CM

## 2019-08-06 DIAGNOSIS — I10 ESSENTIAL HYPERTENSION: ICD-10-CM

## 2019-08-06 DIAGNOSIS — R06.83 SNORING: ICD-10-CM

## 2019-08-06 DIAGNOSIS — R20.0 NUMBNESS: ICD-10-CM

## 2019-08-06 DIAGNOSIS — Z00.00 ENCOUNTER FOR PREVENTIVE HEALTH EXAMINATION: Primary | ICD-10-CM

## 2019-08-06 DIAGNOSIS — Z12.11 SCREEN FOR COLON CANCER: ICD-10-CM

## 2019-08-06 DIAGNOSIS — F90.9 ATTENTION DEFICIT HYPERACTIVITY DISORDER (ADHD), UNSPECIFIED ADHD TYPE: ICD-10-CM

## 2019-08-06 DIAGNOSIS — N63.11 BREAST LUMP ON RIGHT SIDE AT 11 O'CLOCK POSITION: Primary | ICD-10-CM

## 2019-08-06 DIAGNOSIS — N63.11 BREAST LUMP ON RIGHT SIDE AT 11 O'CLOCK POSITION: ICD-10-CM

## 2019-08-06 DIAGNOSIS — N64.4 BREAST PAIN: ICD-10-CM

## 2019-08-06 DIAGNOSIS — M54.31 RIGHT SIDED SCIATICA: ICD-10-CM

## 2019-08-06 DIAGNOSIS — K21.00 GASTROESOPHAGEAL REFLUX DISEASE WITH ESOPHAGITIS: ICD-10-CM

## 2019-08-06 DIAGNOSIS — Z13.89 ENCOUNTER FOR SCREENING FOR OTHER DISORDER: ICD-10-CM

## 2019-08-06 LAB
ALBUMIN SERPL-MCNC: 4.3 G/DL (ref 3.5–5.2)
ALBUMIN/GLOB SERPL: 1.4 G/DL
ALP SERPL-CCNC: 54 U/L (ref 39–117)
ALT SERPL W P-5'-P-CCNC: 8 U/L (ref 1–33)
ANION GAP SERPL CALCULATED.3IONS-SCNC: 10.6 MMOL/L (ref 5–15)
AST SERPL-CCNC: 14 U/L (ref 1–32)
BASOPHILS # BLD AUTO: 0.04 10*3/MM3 (ref 0–0.2)
BASOPHILS NFR BLD AUTO: 0.5 % (ref 0–1.5)
BILIRUB SERPL-MCNC: 0.6 MG/DL (ref 0.2–1.2)
BUN BLD-MCNC: 6 MG/DL (ref 6–20)
BUN/CREAT SERPL: 8.2 (ref 7–25)
CALCIUM SPEC-SCNC: 9.9 MG/DL (ref 8.6–10.5)
CHLORIDE SERPL-SCNC: 104 MMOL/L (ref 98–107)
CHOLEST SERPL-MCNC: 140 MG/DL (ref 0–200)
CO2 SERPL-SCNC: 28.4 MMOL/L (ref 22–29)
CREAT BLD-MCNC: 0.73 MG/DL (ref 0.57–1)
DEPRECATED RDW RBC AUTO: 46.3 FL (ref 37–54)
EOSINOPHIL # BLD AUTO: 0.31 10*3/MM3 (ref 0–0.4)
EOSINOPHIL NFR BLD AUTO: 3.9 % (ref 0.3–6.2)
ERYTHROCYTE [DISTWIDTH] IN BLOOD BY AUTOMATED COUNT: 13 % (ref 12.3–15.4)
GFR SERPL CREATININE-BSD FRML MDRD: 86 ML/MIN/1.73
GLOBULIN UR ELPH-MCNC: 3 GM/DL
GLUCOSE BLD-MCNC: 96 MG/DL (ref 65–99)
HCT VFR BLD AUTO: 46.8 % (ref 34–46.6)
HDLC SERPL-MCNC: 46 MG/DL (ref 40–60)
HGB BLD-MCNC: 14.9 G/DL (ref 12–15.9)
IMM GRANULOCYTES # BLD AUTO: 0.02 10*3/MM3 (ref 0–0.05)
IMM GRANULOCYTES NFR BLD AUTO: 0.3 % (ref 0–0.5)
LDLC SERPL CALC-MCNC: 78 MG/DL (ref 0–100)
LDLC/HDLC SERPL: 1.69 {RATIO}
LYMPHOCYTES # BLD AUTO: 2 10*3/MM3 (ref 0.7–3.1)
LYMPHOCYTES NFR BLD AUTO: 25 % (ref 19.6–45.3)
MCH RBC QN AUTO: 31.1 PG (ref 26.6–33)
MCHC RBC AUTO-ENTMCNC: 31.8 G/DL (ref 31.5–35.7)
MCV RBC AUTO: 97.7 FL (ref 79–97)
MONOCYTES # BLD AUTO: 0.7 10*3/MM3 (ref 0.1–0.9)
MONOCYTES NFR BLD AUTO: 8.8 % (ref 5–12)
NEUTROPHILS # BLD AUTO: 4.93 10*3/MM3 (ref 1.7–7)
NEUTROPHILS NFR BLD AUTO: 61.5 % (ref 42.7–76)
NRBC BLD AUTO-RTO: 0 /100 WBC (ref 0–0.2)
PLATELET # BLD AUTO: 276 10*3/MM3 (ref 140–450)
PMV BLD AUTO: 11 FL (ref 6–12)
POTASSIUM BLD-SCNC: 4.9 MMOL/L (ref 3.5–5.2)
PROT SERPL-MCNC: 7.3 G/DL (ref 6–8.5)
RBC # BLD AUTO: 4.79 10*6/MM3 (ref 3.77–5.28)
SODIUM BLD-SCNC: 143 MMOL/L (ref 136–145)
T4 FREE SERPL-MCNC: 1.16 NG/DL (ref 0.93–1.7)
TRIGL SERPL-MCNC: 81 MG/DL (ref 0–150)
TSH SERPL DL<=0.05 MIU/L-ACNC: 1 MIU/ML (ref 0.27–4.2)
VLDLC SERPL-MCNC: 16.2 MG/DL (ref 5–40)
WBC NRBC COR # BLD: 8 10*3/MM3 (ref 3.4–10.8)

## 2019-08-06 PROCEDURE — 80053 COMPREHEN METABOLIC PANEL: CPT | Performed by: NURSE PRACTITIONER

## 2019-08-06 PROCEDURE — 85025 COMPLETE CBC W/AUTO DIFF WBC: CPT | Performed by: NURSE PRACTITIONER

## 2019-08-06 PROCEDURE — 99214 OFFICE O/P EST MOD 30 MIN: CPT | Performed by: NURSE PRACTITIONER

## 2019-08-06 PROCEDURE — 84439 ASSAY OF FREE THYROXINE: CPT | Performed by: NURSE PRACTITIONER

## 2019-08-06 PROCEDURE — 36415 COLL VENOUS BLD VENIPUNCTURE: CPT | Performed by: NURSE PRACTITIONER

## 2019-08-06 PROCEDURE — 80061 LIPID PANEL: CPT | Performed by: NURSE PRACTITIONER

## 2019-08-06 PROCEDURE — 84443 ASSAY THYROID STIM HORMONE: CPT | Performed by: NURSE PRACTITIONER

## 2019-08-06 RX ORDER — SUMATRIPTAN 25 MG/1
TABLET, FILM COATED ORAL
Qty: 9 TABLET | Refills: 2 | Status: SHIPPED | OUTPATIENT
Start: 2019-08-06 | End: 2019-10-09

## 2019-08-06 RX ORDER — OMEPRAZOLE 20 MG/1
20 CAPSULE, DELAYED RELEASE ORAL 2 TIMES DAILY
Qty: 60 CAPSULE | Refills: 6 | Status: SHIPPED | OUTPATIENT
Start: 2019-08-06 | End: 2020-02-25 | Stop reason: SDUPTHER

## 2019-08-06 RX ORDER — DEXTROAMPHETAMINE SACCHARATE, AMPHETAMINE ASPARTATE MONOHYDRATE, DEXTROAMPHETAMINE SULFATE AND AMPHETAMINE SULFATE 5; 5; 5; 5 MG/1; MG/1; MG/1; MG/1
20 CAPSULE, EXTENDED RELEASE ORAL EVERY MORNING
Refills: 0 | COMMUNITY
Start: 2019-07-26 | End: 2020-09-21

## 2019-08-07 ENCOUNTER — APPOINTMENT (OUTPATIENT)
Dept: ULTRASOUND IMAGING | Facility: HOSPITAL | Age: 44
End: 2019-08-07

## 2019-08-13 DIAGNOSIS — I10 ESSENTIAL HYPERTENSION: ICD-10-CM

## 2019-08-13 RX ORDER — LISINOPRIL AND HYDROCHLOROTHIAZIDE 20; 12.5 MG/1; MG/1
1 TABLET ORAL DAILY
Qty: 90 TABLET | Refills: 0 | Status: SHIPPED | OUTPATIENT
Start: 2019-08-13 | End: 2019-11-14 | Stop reason: SDUPTHER

## 2019-08-28 ENCOUNTER — HOSPITAL ENCOUNTER (OUTPATIENT)
Dept: MAMMOGRAPHY | Facility: HOSPITAL | Age: 44
Discharge: HOME | End: 2019-08-28

## 2019-08-28 ENCOUNTER — APPOINTMENT (OUTPATIENT)
Dept: ULTRASOUND IMAGING | Facility: HOSPITAL | Age: 44
End: 2019-08-28

## 2019-08-28 ENCOUNTER — TRANSCRIBE ORDERS (OUTPATIENT)
Dept: MAMMOGRAPHY | Facility: HOSPITAL | Age: 44
End: 2019-08-28

## 2019-08-28 ENCOUNTER — HOSPITAL ENCOUNTER (OUTPATIENT)
Dept: ULTRASOUND IMAGING | Facility: HOSPITAL | Age: 44
Discharge: HOME | End: 2019-08-28
Admitting: NURSE PRACTITIONER

## 2019-08-28 DIAGNOSIS — N63.11 BREAST LUMP ON RIGHT SIDE AT 11 O'CLOCK POSITION: ICD-10-CM

## 2019-08-28 DIAGNOSIS — R92.8 ABNORMAL MAMMOGRAM: Primary | ICD-10-CM

## 2019-08-28 PROCEDURE — 77066 DX MAMMO INCL CAD BI: CPT | Performed by: RADIOLOGY

## 2019-08-28 PROCEDURE — 76642 ULTRASOUND BREAST LIMITED: CPT

## 2019-08-28 PROCEDURE — 77062 BREAST TOMOSYNTHESIS BI: CPT | Performed by: RADIOLOGY

## 2019-08-28 PROCEDURE — 76642 ULTRASOUND BREAST LIMITED: CPT | Performed by: RADIOLOGY

## 2019-08-28 PROCEDURE — 77066 DX MAMMO INCL CAD BI: CPT

## 2019-08-28 PROCEDURE — G0279 TOMOSYNTHESIS, MAMMO: HCPCS

## 2019-09-18 ENCOUNTER — APPOINTMENT (OUTPATIENT)
Dept: ULTRASOUND IMAGING | Facility: HOSPITAL | Age: 44
End: 2019-09-18

## 2019-09-30 ENCOUNTER — PATIENT MESSAGE (OUTPATIENT)
Dept: NEUROLOGY | Facility: CLINIC | Age: 44
End: 2019-09-30

## 2019-10-09 ENCOUNTER — OFFICE VISIT (OUTPATIENT)
Dept: NEUROLOGY | Facility: CLINIC | Age: 44
End: 2019-10-09

## 2019-10-09 VITALS
HEIGHT: 65 IN | DIASTOLIC BLOOD PRESSURE: 72 MMHG | WEIGHT: 157 LBS | SYSTOLIC BLOOD PRESSURE: 128 MMHG | BODY MASS INDEX: 26.16 KG/M2

## 2019-10-09 DIAGNOSIS — R51.9 NONINTRACTABLE EPISODIC HEADACHE, UNSPECIFIED HEADACHE TYPE: ICD-10-CM

## 2019-10-09 PROCEDURE — 99204 OFFICE O/P NEW MOD 45 MIN: CPT | Performed by: PSYCHIATRY & NEUROLOGY

## 2019-10-09 RX ORDER — SUMATRIPTAN 100 MG/1
TABLET, FILM COATED ORAL
Qty: 9 TABLET | Refills: 3 | Status: SHIPPED | OUTPATIENT
Start: 2019-10-09 | End: 2020-05-11

## 2019-10-09 NOTE — PROGRESS NOTES
Subjective:    CC: Reyna Wiess is seen today in consultation at the request of No ref. provider found for Numbness and Headache       HPI:  44-year-old female referred to the clinic for evaluation of headaches.  She reports that she started having headaches back in 1989.  Over the period of time, they become more intense.  Currently she is reporting 1-2 headache in a month but it lasts for 3 to 4 days.  Typically, the headache will start in the right occipital region and then would radiate to involve top of the head and sometimes it would involve right face associated with her right facial weakness and numbness.  She reports that it is of dull achy type of pain with maximum pain intensity being 10 out of 10.  It usually starts as mild dull headache and then the intensity of pain would increase.  Typical triggers are stress, perfume order, cigarette order, under or oversleeping.  Recently, she was started on Imitrex 25 mg as needed which helps for a few hours and the headache returns.  She denies any side effects with Imitrex use.    The following portions of the patient's history were reviewed today and updated as of 10/09/2019  : allergies, social history and problem list.  This document will be scanned to patient's chart.      Current Outpatient Medications:   •  amphetamine-dextroamphetamine (ADDERALL) 20 MG tablet, Take 1 tablet by mouth 2 (Two) Times a Day. (Patient taking differently: Take 20 mg by mouth Daily.), Disp: 60 tablet, Rfl: 0  •  amphetamine-dextroamphetamine XR (ADDERALL XR) 20 MG 24 hr capsule, Take 20 mg by mouth Every Morning, Disp: , Rfl: 0  •  fluticasone (FLONASE ALLERGY RELIEF) 50 MCG/ACT nasal spray, 2 sprays into the nostril(s) as directed by provider Daily., Disp: 1 bottle, Rfl: 6  •  lisinopril-hydrochlorothiazide (PRINZIDE,ZESTORETIC) 20-12.5 MG per tablet, Take 1 tablet by mouth Daily., Disp: 90 tablet, Rfl: 0  •  montelukast (SINGULAIR) 10 MG tablet, Take 1 tablet by mouth Every  "Night., Disp: 30 tablet, Rfl: 6  •  omeprazole (PRILOSEC) 20 MG capsule, Take 1 capsule by mouth 2 (Two) Times a Day., Disp: 60 capsule, Rfl: 6  •  SUMAtriptan (IMITREX) 100 MG tablet, Take one tablet at onset of headache. May repeat dose one time in 2 hours if headache not relieved., Disp: 9 tablet, Rfl: 3   Past Medical History:   Diagnosis Date   • Depression       History reviewed. No pertinent surgical history.   Family History   Problem Relation Age of Onset   • Breast cancer Maternal Aunt         age unknown   • Ovarian cancer Neg Hx       Review of Systems   Constitutional: Positive for fatigue.   HENT: Positive for ear pain and sore throat.    Eyes: Positive for pain, discharge, itching and visual disturbance.   Gastrointestinal: Positive for abdominal pain.   Musculoskeletal: Positive for neck pain and neck stiffness.   Neurological: Positive for dizziness, light-headedness, numbness and headache.   Psychiatric/Behavioral: Positive for agitation and sleep disturbance. The patient is nervous/anxious.        All other systems reviewed and are negative     Objective:    /72   Ht 163.8 cm (64.5\")   Wt 71.2 kg (157 lb)   BMI 26.53 kg/m²     Neurology Exam:    General apperance: NAD.     Mental status: Alert, awake and oriented to time place and person.    Recent and Remote memory: Can recall 3/3 objects at 5 minutes. Can recall historical events.     Attention span and Concentration: Serial 7s: Normal.     Fund of knowledge:  Normal.     Language and Speech: No aphasia or dysarthria.    Naming , Repitition and Comprehension:  Can name objects, repeat a sentence and follow commands. Speech is clear and fluent with good repetition, comprehension, and naming.    Cranial Nerves:   CN II: Visual fields are full. Intact. Fundi - Normal, No papillederma, Pupils - TODD  CN III, IV and VI: Extraocular movements are intact. Normal saccades.   CN V: Facial sensation is intact.   CN VII: Muscles of facial " expression reveal no asymmetry. Intact.   CN VIII: Hearing is intact. Whispered voice intact.   CN IX and X: Palate elevates symmetrically. Intact  CN XI: Shoulder shrug is intact.   CN XII: Tongue is midline without evidence of atrophy or fasciculation.     Motor:  Right UE muscle strength 5/5. Normal tone.     Left UE muscle strength 5/5. Normal tone.      Right LE muscle strength5/5. Normal tone.     Left LE muscle strength 5/5. Normal tone.      Sensory: Normal light touch, vibration and pinprick sensation bilaterally.    DTRs: 2+ bilaterally in upper and lower extremities.    Babinski: Negative bilaterally.    Co-ordination: Normal finger-to-nose, heel to shin B/L.    Rhomberg: Negative.    Gait: Normal.    Cardiovascular: Regular rate and rhythm without murmur, gallop or rub.    Ophthalmoscopic exam: Normal fundi, no papilledema.    Assessment and Plan:  1.Intractable episodic headache.  -She is having occipital headaches with migrainous features.  Since she has responded somewhat to low-dose Imitrex, the dose will be increased to 100 mg as needed for better efficacy.  Because of less than 4 headaches in a month, no preventative therapy is recommended at this time.  Based on the response to high-dose Imitrex, further treatment options will be discussed with the patient. Since she is reporting right facial numbness and associated right facial droop, MRI brain will be ordered for further evaluation as well.    - SUMAtriptan (IMITREX) 100 MG tablet; Take one tablet at onset of headache. May repeat dose one time in 2 hours if headache not relieved.  Dispense: 9 tablet; Refill: 3       Return in about 8 weeks (around 12/4/2019).     Keegan Oreilly MD

## 2019-10-28 ENCOUNTER — APPOINTMENT (OUTPATIENT)
Dept: MRI IMAGING | Facility: HOSPITAL | Age: 44
End: 2019-10-28

## 2019-11-05 ENCOUNTER — OFFICE VISIT (OUTPATIENT)
Dept: GASTROENTEROLOGY | Facility: CLINIC | Age: 44
End: 2019-11-05

## 2019-11-05 VITALS
OXYGEN SATURATION: 98 % | HEART RATE: 47 BPM | SYSTOLIC BLOOD PRESSURE: 122 MMHG | BODY MASS INDEX: 27.55 KG/M2 | WEIGHT: 163 LBS | TEMPERATURE: 98.1 F | DIASTOLIC BLOOD PRESSURE: 80 MMHG

## 2019-11-05 DIAGNOSIS — R10.9 CHRONIC ABDOMINAL PAIN: Primary | ICD-10-CM

## 2019-11-05 DIAGNOSIS — G89.29 CHRONIC ABDOMINAL PAIN: Primary | ICD-10-CM

## 2019-11-05 DIAGNOSIS — K59.00 CONSTIPATION, UNSPECIFIED CONSTIPATION TYPE: ICD-10-CM

## 2019-11-05 DIAGNOSIS — K21.9 GASTROESOPHAGEAL REFLUX DISEASE, ESOPHAGITIS PRESENCE NOT SPECIFIED: ICD-10-CM

## 2019-11-05 PROCEDURE — 99204 OFFICE O/P NEW MOD 45 MIN: CPT | Performed by: INTERNAL MEDICINE

## 2019-11-05 NOTE — PROGRESS NOTES
PCP: Robel Diehl APRN    Chief Complaint   Patient presents with   • NEW PATIENT     GERD   • Heartburn        History of Present Illness:   Reyna Weiss is a 44 y.o. female who presents to the GI clinic for assessment of gerd, chronic abdominal pain, chronic constipation.   GERD: has symptoms of sharp burning throat pain immediately after eating.  + regurgitation. No vomiting. No dysphagia. Prilosec helps but she has concerns with long term use.  Constipation: has a hard bm q3-4 days. Sometimes has to manipulate to evacuate.  No hematochezia  Chronic abdominal pain: has epigastric sharp intermittent pain with eating, pain is achy, does not radiate. NO history of abdominal surgery.     Past Medical History:   Diagnosis Date   • Depression        No past surgical history on file.      Current Outpatient Medications:   •  amphetamine-dextroamphetamine (ADDERALL) 20 MG tablet, Take 1 tablet by mouth 2 (Two) Times a Day. (Patient taking differently: Take 20 mg by mouth Daily.), Disp: 60 tablet, Rfl: 0  •  amphetamine-dextroamphetamine XR (ADDERALL XR) 20 MG 24 hr capsule, Take 20 mg by mouth Every Morning, Disp: , Rfl: 0  •  fluticasone (FLONASE ALLERGY RELIEF) 50 MCG/ACT nasal spray, 2 sprays into the nostril(s) as directed by provider Daily., Disp: 1 bottle, Rfl: 6  •  lisinopril-hydrochlorothiazide (PRINZIDE,ZESTORETIC) 20-12.5 MG per tablet, Take 1 tablet by mouth Daily., Disp: 90 tablet, Rfl: 0  •  montelukast (SINGULAIR) 10 MG tablet, Take 1 tablet by mouth Every Night., Disp: 30 tablet, Rfl: 6  •  omeprazole (PRILOSEC) 20 MG capsule, Take 1 capsule by mouth 2 (Two) Times a Day., Disp: 60 capsule, Rfl: 6  •  SUMAtriptan (IMITREX) 100 MG tablet, Take one tablet at onset of headache. May repeat dose one time in 2 hours if headache not relieved., Disp: 9 tablet, Rfl: 3    No Known Allergies    Family History   Problem Relation Age of Onset   • Breast cancer Maternal Aunt         age unknown   • Ovarian cancer  Neg Hx        Social History     Socioeconomic History   • Marital status:      Spouse name: Not on file   • Number of children: Not on file   • Years of education: Not on file   • Highest education level: Not on file   Tobacco Use   • Smoking status: Never Smoker   • Smokeless tobacco: Never Used   Substance and Sexual Activity   • Alcohol use: No   • Drug use: No   • Sexual activity: Defer       Review of Systems   Constitutional: Negative for fever.   Gastrointestinal: Positive for abdominal pain. Negative for constipation, diarrhea and vomiting.   Musculoskeletal: Negative for arthralgias and myalgias.   Neurological: Negative for headaches.     All other systems reviewed and are negative.    There were no vitals filed for this visit.    Physical Exam  General Appearance:  Vitals as above. no acute distress  Head/face:  Normocephalic, atraumatic  Eyes:   EOMI, no conjunctivitis or icterus   Nose/Sinuses:  Nares patent bilaterally without discharge or lesions  Mouth/Throat:  Posterior pharynx is pink without drainage or exudates;  dentition is in good condition and repair  Neck:  trachea is midline, no thyromegaly  Lungs:  Clear to auscultation bilaterally  Heart:  Regular rate and rhythm without murmur, gallop or rub  Abdomen:  Soft, non-tender to palpation, no obvious masses, bowel sounds positive in four quadrants; no abdominal bruits; no guarding or rebound tenderness  Neurologic:  Alert; no focal deficits; age appropriate behavior and speech  Psychiatric: mood and affect are congruent  Vascular: extremities without edema  Skin: no rash or cyanosis.      Assessment/Plan  1.) Chronic abdominal pain  2.) Family history of stomach cancer  Recommend optimization of bowel habits and due to persistence, an egd  - unclear etiology but she has symptoms that certainly overlap with IBS-C.  See management for constipation    3.) GERD  Continue ppi for now. The risk of PPI was discussed including the low but  possible risk of kidney, bone, infection, cognitive, and electrolyte abnormalities. The benefit of PPI was discussed including quality of life.  We determined periodic risk/benefit assessment and continued prescription was in the patient's best interest.    also recommend EGD.    4.) Constipation  She has features of both slow transit and outlet constipation as she has to also manually remove stool on occasion.  I would have her proceed with biofeedback pt and start linzess samples (3 samples of different strengths given). Advised her to follow up with ob to rule out uterine prolapse.      5.) HCM  To screening colonoscopy.    RTC in 6 months.    Timmy Montoya MD  11/5/2019  Answers for HPI/ROS submitted by the patient on 11/4/2019   Abdominal pain  Chronicity: recurrent  Onset: more than 1 year ago  Onset quality: undetermined  Frequency: daily  Episode duration: 2 hours  Progression since onset: gradually worsening  Pain location: epigastric region  Pain - numeric: 6/10  Pain quality: burning, cramping, a sensation of fullness  Radiates to: does not radiate  anorexia: No  flatus: Yes  hematochezia: No  melena: No  weight loss: No  Aggravated by: eating  Relieved by: nothing

## 2019-11-11 ENCOUNTER — HOSPITAL ENCOUNTER (OUTPATIENT)
Dept: MRI IMAGING | Facility: HOSPITAL | Age: 44
Discharge: HOME | End: 2019-11-11
Admitting: PSYCHIATRY & NEUROLOGY

## 2019-11-11 DIAGNOSIS — R51.9 NONINTRACTABLE EPISODIC HEADACHE, UNSPECIFIED HEADACHE TYPE: ICD-10-CM

## 2019-11-11 PROCEDURE — 70551 MRI BRAIN STEM W/O DYE: CPT

## 2019-11-13 ENCOUNTER — TELEPHONE (OUTPATIENT)
Dept: GASTROENTEROLOGY | Facility: CLINIC | Age: 44
End: 2019-11-13

## 2019-11-13 ENCOUNTER — TELEPHONE (OUTPATIENT)
Dept: NEUROLOGY | Facility: CLINIC | Age: 44
End: 2019-11-13

## 2019-11-13 NOTE — TELEPHONE ENCOUNTER
Ske with pt and informed her that her MRI of Brain was normal. Advised to keep her appt on 12/5. Pt acknowledged understanding.

## 2019-11-14 ENCOUNTER — TELEPHONE (OUTPATIENT)
Dept: INTERNAL MEDICINE | Facility: CLINIC | Age: 44
End: 2019-11-14

## 2019-11-14 DIAGNOSIS — I10 ESSENTIAL HYPERTENSION: ICD-10-CM

## 2019-11-14 RX ORDER — LISINOPRIL AND HYDROCHLOROTHIAZIDE 20; 12.5 MG/1; MG/1
1 TABLET ORAL DAILY
Qty: 90 TABLET | Refills: 0 | Status: SHIPPED | OUTPATIENT
Start: 2019-11-14 | End: 2020-02-07 | Stop reason: SDUPTHER

## 2019-11-14 RX ORDER — LISINOPRIL AND HYDROCHLOROTHIAZIDE 20; 12.5 MG/1; MG/1
1 TABLET ORAL DAILY
Qty: 90 TABLET | Refills: 0 | Status: CANCELLED | OUTPATIENT
Start: 2019-11-14

## 2019-11-14 NOTE — TELEPHONE ENCOUNTER
Pt stated that she is completely out of medication. Informed pt that Robel Diehl is no longer with the practice and we could establish her with one of the other providers.Reyna stated that she was just in for a physical and did not want to make an appt.    Reyna requested a call with options at [phone]

## 2019-11-14 NOTE — TELEPHONE ENCOUNTER
Can we call in a 90 day supply and pt will make appt to re-establish when she gets to her calender ?

## 2019-11-14 NOTE — TELEPHONE ENCOUNTER
Notified Reyna that Robel is no longer hear and that she would have to establish care with a provider her to get her BP filled .  Transferred call to front office for them to schedule an appt to establish care for BP medication .  Verb understanding given

## 2019-11-21 ENCOUNTER — TELEPHONE (OUTPATIENT)
Dept: MAMMOGRAPHY | Facility: HOSPITAL | Age: 44
End: 2019-11-21

## 2019-11-21 NOTE — TELEPHONE ENCOUNTER
Patient left message requesting someone return her call about an appointment.  Attempted to call patient.  No voicemail to leave a message.

## 2019-11-26 ENCOUNTER — TELEPHONE (OUTPATIENT)
Dept: GASTROENTEROLOGY | Facility: CLINIC | Age: 44
End: 2019-11-26

## 2019-11-26 NOTE — TELEPHONE ENCOUNTER
Mally,  Patient called and left message concerning a Linzess. She would like for you to call [facility]. Medication need PA.

## 2019-11-26 NOTE — TELEPHONE ENCOUNTER
"CALLED [facility] REGARDING PT PRIOR AUTH FOR LINZESS; A FORM WILL BE FAXED FOR COMPLETION.  INITIATED PRIOR AUTH ON [employer]; PRIOR AUTH WAS SENT TO [facility] \"REVIEWING THE PA AND WILL RESPOND WITHIN 24 HOURS OR UP TO 72 HOURS\".    "

## 2019-11-26 NOTE — TELEPHONE ENCOUNTER
CALLED PT TO INFORM THE PRIOR AUTH WAS SENT TO [facility] BY JAIMIE.  ADVISED NO DETERMINATION HAS BEEN MADE ON THE AUTH AT THIS TIME. A RETURN CALL WILL BE MADE ONCE WE RECEIVE A DETERMINATION. NO ANSWER, SLIM

## 2019-11-26 NOTE — TELEPHONE ENCOUNTER
Gavin,  I have called [facility] and done the Prior Auth on cover my med. Prior Auth is pending review.  Thank you

## 2019-11-27 ENCOUNTER — TELEPHONE (OUTPATIENT)
Dept: GASTROENTEROLOGY | Facility: CLINIC | Age: 44
End: 2019-11-27

## 2019-11-27 NOTE — TELEPHONE ENCOUNTER
Called pt to inform of the Linzess denial; offered patient samples to p/u in office. Pt states she will p/u samples next week. I also advised I sent DR. Montoya a message to inform him and to request a new RX for similar medication to Linzess.  Pt voiced understanding

## 2019-12-03 RX ORDER — LUBIPROSTONE 24 UG/1
24 CAPSULE ORAL 2 TIMES DAILY WITH MEALS
Qty: 90 CAPSULE | Refills: 3 | Status: SHIPPED | OUTPATIENT
Start: 2019-12-03 | End: 2020-01-28

## 2019-12-04 ENCOUNTER — PATIENT MESSAGE (OUTPATIENT)
Dept: NEUROLOGY | Facility: CLINIC | Age: 44
End: 2019-12-04

## 2019-12-18 ENCOUNTER — TELEPHONE (OUTPATIENT)
Dept: GASTROENTEROLOGY | Facility: CLINIC | Age: 44
End: 2019-12-18

## 2019-12-18 NOTE — TELEPHONE ENCOUNTER
SAMPLES FOR LINZESS HAS BEEN TAKEN OFF SAMPLE SHELF AND PLACED BACK IN SAMPLE CLOSET. PT WAS INFORMED ON 11/27/19 THAT LINZESS SAMPLES ARE AVAILABLE TO  AT HER CONVENIENCE. PATIENT DID NOT COME TO GET SAMPLES AS OF 12/18/19.

## 2020-01-28 ENCOUNTER — OFFICE VISIT (OUTPATIENT)
Dept: FAMILY MEDICINE CLINIC | Facility: CLINIC | Age: 45
End: 2020-01-28

## 2020-01-28 VITALS
SYSTOLIC BLOOD PRESSURE: 124 MMHG | DIASTOLIC BLOOD PRESSURE: 82 MMHG | TEMPERATURE: 97.8 F | RESPIRATION RATE: 16 BRPM | WEIGHT: 155 LBS | OXYGEN SATURATION: 98 % | BODY MASS INDEX: 25.83 KG/M2 | HEART RATE: 88 BPM | HEIGHT: 65 IN

## 2020-01-28 DIAGNOSIS — J30.1 NON-SEASONAL ALLERGIC RHINITIS DUE TO POLLEN: ICD-10-CM

## 2020-01-28 DIAGNOSIS — N63.0 BREAST LUMP: ICD-10-CM

## 2020-01-28 DIAGNOSIS — I10 ESSENTIAL HYPERTENSION: ICD-10-CM

## 2020-01-28 DIAGNOSIS — G56.03 BILATERAL CARPAL TUNNEL SYNDROME: Primary | ICD-10-CM

## 2020-01-28 PROCEDURE — 99214 OFFICE O/P EST MOD 30 MIN: CPT | Performed by: FAMILY MEDICINE

## 2020-01-28 NOTE — PROGRESS NOTES
Subjective   Reyna Weiss is a 44 y.o. female.   Establish care- used to be seen at [facility]  Pt is plant based  1. Has CTS and has had a steroid injection in the past and would like a referral to a hand specialist. Takes Ibuprofen prn some relief.  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 PE and labs UTD.  is in [employer] and they move a lot.  Follows with psych for Adderall.  2. Has pain in right breast and had mammo and US  9/19 and found to have a cyst.  3. Needs a referral for ENT for chronic sinus/allergy    Hypertension   This is a chronic problem. The current episode started more than 1 year ago. The problem is unchanged. The problem is controlled. Pertinent negatives include no anxiety, blurred vision, chest pain, malaise/fatigue, palpitations, peripheral edema or shortness of breath. There are no known risk factors for coronary artery disease. Past treatments include ACE inhibitors and diuretics. Current antihypertension treatment includes diuretics and ACE inhibitors. The current treatment provides significant improvement. There are no compliance problems.         The following portions of the patient's history were reviewed and updated as appropriate: allergies, current medications, past family history, past medical history, past social history, past surgical history and problem list.  Vitals:    01/28/20 0820   BP: 124/82   Pulse: 88   Resp: 16   Temp: 97.8 °F (36.6 °C)   SpO2: 98%     Body mass index is 26.19 kg/m².  Review of Systems   Constitutional: Negative.  Negative for activity change, fatigue, fever, malaise/fatigue, unexpected weight gain and unexpected weight loss.   HENT: Negative.  Negative for congestion, sneezing and sore throat.    Eyes: Negative.  Negative for blurred vision, double vision and visual disturbance.   Respiratory: Negative.  Negative for cough, chest tightness, shortness of breath and wheezing.    Cardiovascular: Negative.  Negative for chest pain, palpitations and leg swelling.    Gastrointestinal: Negative.  Negative for abdominal distention, abdominal pain, blood in stool, constipation, diarrhea and nausea.   Endocrine: Negative.  Negative for cold intolerance and heat intolerance.   Genitourinary: Negative.  Negative for dysuria, frequency, urgency and urinary incontinence.   Musculoskeletal: Negative.  Negative for arthralgias and myalgias.   Skin: Negative.  Negative for rash.   Allergic/Immunologic: Negative.    Neurological: Negative.  Negative for dizziness, syncope, numbness and memory problem.   Hematological: Negative.  Negative for adenopathy.   Psychiatric/Behavioral: Negative.  Negative for suicidal ideas and depressed mood. The patient is not nervous/anxious.    All other systems reviewed and are negative.      Objective   Physical Exam   Constitutional: She is oriented to person, place, and time. She appears well-developed and well-nourished. No distress.   HENT:   Right Ear: External ear normal.   Left Ear: External ear normal.   Nose: Nose normal.   Mouth/Throat: Oropharynx is clear and moist.   Eyes: Pupils are equal, round, and reactive to light. Conjunctivae and EOM are normal.   Neck: Normal range of motion. Neck supple. No thyromegaly present.   Cardiovascular: Normal rate, regular rhythm and normal heart sounds.   No murmur heard.  Pulmonary/Chest: Effort normal and breath sounds normal. No respiratory distress. She has no wheezes.   Abdominal: Soft. Bowel sounds are normal. She exhibits no distension and no mass. There is no tenderness. There is no rebound and no guarding. No hernia.   Musculoskeletal: Normal range of motion. She exhibits no edema or tenderness.   Lymphadenopathy:     She has no cervical adenopathy.   Neurological: She is alert and oriented to person, place, and time. She has normal reflexes.   Skin: Skin is warm and dry. No rash noted. She is not diaphoretic. No erythema. No pallor.   Psychiatric: She has a normal mood and affect. Her behavior is  normal. Judgment and thought content normal.   Nursing note and vitals reviewed.          Assessment/Plan   Reyna was seen today for establish care.    Diagnoses and all orders for this visit:    Bilateral carpal tunnel syndrome  -     Ambulatory Referral to Hand Surgery    Essential hypertension    Breast lump  -     Ambulatory Referral to General Surgery    Non-seasonal allergic rhinitis due to pollen  -     Ambulatory Referral to ENT (Otolaryngology)

## 2020-02-07 ENCOUNTER — TELEPHONE (OUTPATIENT)
Dept: FAMILY MEDICINE CLINIC | Facility: CLINIC | Age: 45
End: 2020-02-07

## 2020-02-07 DIAGNOSIS — I10 ESSENTIAL HYPERTENSION: ICD-10-CM

## 2020-02-07 NOTE — TELEPHONE ENCOUNTER
Patient called requesting refill for   lisinopril-hydrochlorothiazide (PRINZIDE,ZESTORETIC) 20-12.5 MG per tablet    Please advise.  Callback: [phone]    Pharmacy:[facility] #[id number] [address] AT [facility]

## 2020-02-10 RX ORDER — LISINOPRIL AND HYDROCHLOROTHIAZIDE 20; 12.5 MG/1; MG/1
1 TABLET ORAL DAILY
Qty: 90 TABLET | Refills: 1 | Status: SHIPPED | OUTPATIENT
Start: 2020-02-10 | End: 2020-08-05

## 2020-02-24 ENCOUNTER — TELEPHONE (OUTPATIENT)
Dept: NEUROLOGY | Facility: CLINIC | Age: 45
End: 2020-02-24

## 2020-02-24 NOTE — TELEPHONE ENCOUNTER
Provider: NIRU JONES  Caller: CORY PINA  Relationship to Patient: SELF  Phone Number: [phone]       Reason for Call: PT CALLED STATED SHE HAD A MRI DONE ON 11-19-19 AND WANTS TO KNOW WHO CALLED  AND TOLD THEM THAT A AUTHORIZATION WAS NOT NEEDED. SHE IS NOW GETTING BILLED FOR THE MRI.    PLEASE CALL HER [phone] AS SOON AS YOU CAN.

## 2020-02-25 DIAGNOSIS — K21.00 GASTROESOPHAGEAL REFLUX DISEASE WITH ESOPHAGITIS: ICD-10-CM

## 2020-02-25 RX ORDER — OMEPRAZOLE 20 MG/1
20 CAPSULE, DELAYED RELEASE ORAL 2 TIMES DAILY
Qty: 60 CAPSULE | Refills: 3 | Status: SHIPPED | OUTPATIENT
Start: 2020-02-25 | End: 2020-06-15

## 2020-02-25 NOTE — TELEPHONE ENCOUNTER
Let VM informing pt that all billing inquires is handled through our business office at [phone]

## 2020-02-25 NOTE — TELEPHONE ENCOUNTER
PATIENT CALLED AND REQUEST REFILL FOR  omeprazole (PRILOSEC) 20 MG capsule.    PLEASE ADVISE.  CALL BACK:  [phone]     PHARMACY:  [facility] #[id number] [address] AT [facility]

## 2020-05-09 DIAGNOSIS — R51.9 NONINTRACTABLE EPISODIC HEADACHE, UNSPECIFIED HEADACHE TYPE: ICD-10-CM

## 2020-05-11 RX ORDER — SUMATRIPTAN 100 MG/1
TABLET, FILM COATED ORAL
Qty: 9 TABLET | Refills: 3 | Status: SHIPPED | OUTPATIENT
Start: 2020-05-11 | End: 2020-06-17

## 2020-05-13 ENCOUNTER — TELEMEDICINE (OUTPATIENT)
Dept: GASTROENTEROLOGY | Facility: CLINIC | Age: 45
End: 2020-05-13

## 2020-05-13 DIAGNOSIS — K59.00 CONSTIPATION, UNSPECIFIED CONSTIPATION TYPE: Primary | ICD-10-CM

## 2020-05-13 PROCEDURE — 99214 OFFICE O/P EST MOD 30 MIN: CPT | Performed by: INTERNAL MEDICINE

## 2020-05-13 NOTE — PROGRESS NOTES
PCP: Nithya Thomas DO    No chief complaint on file.  cc: abdominal pain    You have chosen to receive care through a telehealth visit.  Do you consent to use a video/audio connection for your medical care today? Yes      History of Present Illness:   Reyna Weiss is a 44 y.o. female who presents to GI clinic as a follow up for chronic abdominal pain and constipation. She reports a h/o h. Pylori. She states that when she took linzess she had great improvements in pain and bloat. However, she still had epigastric intermittent pain that would last around 2-4 hours.  This pain is achy and persistent and > 6 months.  She denies gib loss. Reports a family history of stomach cancer.    Past Medical History:   Diagnosis Date   • Depression        No past surgical history on file.      Current Outpatient Medications:   •  amphetamine-dextroamphetamine (ADDERALL) 20 MG tablet, Take 1 tablet by mouth 2 (Two) Times a Day. (Patient taking differently: Take 20 mg by mouth Daily.), Disp: 60 tablet, Rfl: 0  •  amphetamine-dextroamphetamine XR (ADDERALL XR) 20 MG 24 hr capsule, Take 20 mg by mouth Every Morning, Disp: , Rfl: 0  •  fluticasone (FLONASE ALLERGY RELIEF) 50 MCG/ACT nasal spray, 2 sprays into the nostril(s) as directed by provider Daily., Disp: 1 bottle, Rfl: 6  •  lisinopril-hydrochlorothiazide (PRINZIDE,ZESTORETIC) 20-12.5 MG per tablet, Take 1 tablet by mouth Daily., Disp: 90 tablet, Rfl: 1  •  montelukast (SINGULAIR) 10 MG tablet, Take 1 tablet by mouth Every Night., Disp: 30 tablet, Rfl: 6  •  omeprazole (PrilOSEC) 20 MG capsule, Take 1 capsule by mouth 2 (Two) Times a Day., Disp: 60 capsule, Rfl: 3  •  SUMAtriptan (IMITREX) 100 MG tablet, TAKE 1 TABLET BY MOUTH AT ONSET MAY REPEAT IN 2 HOURS IF HEADACHE PERSISTS MAXIMUM DAILY DOSE OF 2 TABLETS( 200 MILLIGRAMS) EVERY 24 HOURS, Disp: 9 tablet, Rfl: 3  •  Unable to find, Take 1 each by mouth 1 (One) Time. Herbal Laxative, Disp: , Rfl:   •  Unable to find, Take 1  each by mouth 1 (One) Time. Vegan Omega-3, Disp: , Rfl:     No Known Allergies    Family History   Problem Relation Age of Onset   • Breast cancer Maternal Aunt         age unknown   • Ovarian cancer Neg Hx        Social History     Socioeconomic History   • Marital status:      Spouse name: Not on file   • Number of children: Not on file   • Years of education: Not on file   • Highest education level: Not on file   Tobacco Use   • Smoking status: Never Smoker   • Smokeless tobacco: Never Used   Substance and Sexual Activity   • Alcohol use: No   • Drug use: No   • Sexual activity: Defer       Review of Systems  A complete 12 point ros was asked and is negative except for that mentioned above.  In particular:  No fever  No rash  No increased arthralgias  No worsening edema  No cough  No dyspnea  No chest pain      There were no vitals filed for this visit.    Physical Exam  General: well developed, well nourished  A+O x 3 NAD  HEENT: NCAT, pupils equal appearing, sclera appear white  NECK: full ROM  Respiratory: symmetric chest rise, normal effort, normal work of breathing, no overt rales  Abomen: non-distended  Skin: normal color, no jaundice  Neuro: no tremor, no facial droop  Psych: normal mood and affect      Assessment/Plan  1.) Chronic abdominal pain  2.) Family history of stomach cancer  3.) History of h. pylori  Recommend optimization of bowel habits and due to persistence, an egd  Her pain persisted despite good outcomes with linzess 145 ug suggestive of persistent h. Pylori.      3.) GERD  Continue ppi for now. The risk of PPI was discussed including the low but possible risk of kidney, bone, infection, cognitive, and electrolyte abnormalities. The benefit of PPI was discussed including quality of life.  We determined periodic risk/benefit assessment and continued prescription was in the patient's best interest.    also recommend EGD.    4.) Constipation  She has features of both slow transit and  outlet constipation as she has to also manually remove stool on occasion.   She was referred to biofeedback.  She has tried otc agents such as miralax for many years with suboptimal results. She felt great on 145 ug linzess but insurance would not cover it.  Will have her come in for samples and we will retry with appeal.     5.) HCM  To screening colonoscopy.    RTC in 6 months.      Timmy Montoya MD  5/13/2020

## 2020-06-12 ENCOUNTER — TRANSCRIBE ORDERS (OUTPATIENT)
Dept: MAMMOGRAPHY | Facility: HOSPITAL | Age: 45
End: 2020-06-12

## 2020-06-12 DIAGNOSIS — N64.4 MASTODYNIA: Primary | ICD-10-CM

## 2020-06-13 DIAGNOSIS — K21.00 GASTROESOPHAGEAL REFLUX DISEASE WITH ESOPHAGITIS: ICD-10-CM

## 2020-06-15 RX ORDER — OMEPRAZOLE 20 MG/1
CAPSULE, DELAYED RELEASE ORAL
Qty: 60 CAPSULE | Refills: 3 | Status: SHIPPED | OUTPATIENT
Start: 2020-06-15 | End: 2020-10-22

## 2020-06-17 DIAGNOSIS — Z12.11 SCREENING FOR COLON CANCER: Primary | ICD-10-CM

## 2020-06-17 DIAGNOSIS — R51.9 NONINTRACTABLE EPISODIC HEADACHE, UNSPECIFIED HEADACHE TYPE: ICD-10-CM

## 2020-06-17 RX ORDER — SUMATRIPTAN 100 MG/1
TABLET, FILM COATED ORAL
Qty: 9 TABLET | Refills: 3 | Status: SHIPPED | OUTPATIENT
Start: 2020-06-17 | End: 2020-11-29

## 2020-06-22 ENCOUNTER — APPOINTMENT (OUTPATIENT)
Dept: PREADMISSION TESTING | Facility: HOSPITAL | Age: 45
End: 2020-06-22

## 2020-06-22 LAB
REF LAB TEST METHOD: NORMAL
SARS-COV-2 RNA RESP QL NAA+PROBE: NOT DETECTED

## 2020-06-22 PROCEDURE — C9803 HOPD COVID-19 SPEC COLLECT: HCPCS

## 2020-06-22 PROCEDURE — U0004 COV-19 TEST NON-CDC HGH THRU: HCPCS

## 2020-06-22 PROCEDURE — U0002 COVID-19 LAB TEST NON-CDC: HCPCS

## 2020-06-23 ENCOUNTER — TELEPHONE (OUTPATIENT)
Dept: GASTROENTEROLOGY | Facility: CLINIC | Age: 45
End: 2020-06-23

## 2020-06-23 NOTE — TELEPHONE ENCOUNTER
Dr Montoya,  I spoke with patient this afternoon. Ms Mar stated she didn't read her instructions clearly yesterday. She a little bit of nuts yesterday 6/22/2020. Patient has not ate anything today; only clear liquids. I explained to her to make sure she drink plenty of clear liquids today. She should be okay for Colonoscopy tomorrow. Patient voiced understanding.

## 2020-06-24 ENCOUNTER — OUTSIDE FACILITY SERVICE (OUTPATIENT)
Dept: GASTROENTEROLOGY | Facility: CLINIC | Age: 45
End: 2020-06-24

## 2020-06-24 ENCOUNTER — LAB REQUISITION (OUTPATIENT)
Dept: LAB | Facility: HOSPITAL | Age: 45
End: 2020-06-24

## 2020-06-24 DIAGNOSIS — Z12.11 ENCOUNTER FOR SCREENING FOR MALIGNANT NEOPLASM OF COLON: ICD-10-CM

## 2020-06-24 PROCEDURE — 88305 TISSUE EXAM BY PATHOLOGIST: CPT | Performed by: INTERNAL MEDICINE

## 2020-06-24 PROCEDURE — 45380 COLONOSCOPY AND BIOPSY: CPT | Performed by: INTERNAL MEDICINE

## 2020-06-24 PROCEDURE — 43239 EGD BIOPSY SINGLE/MULTIPLE: CPT | Performed by: INTERNAL MEDICINE

## 2020-06-25 LAB
CYTO UR: NORMAL
LAB AP CASE REPORT: NORMAL
LAB AP CLINICAL INFORMATION: NORMAL
PATH REPORT.FINAL DX SPEC: NORMAL
PATH REPORT.GROSS SPEC: NORMAL

## 2020-07-06 ENCOUNTER — OFFICE VISIT (OUTPATIENT)
Dept: FAMILY MEDICINE CLINIC | Facility: CLINIC | Age: 45
End: 2020-07-06

## 2020-07-06 VITALS
WEIGHT: 167 LBS | OXYGEN SATURATION: 99 % | DIASTOLIC BLOOD PRESSURE: 86 MMHG | BODY MASS INDEX: 28.51 KG/M2 | TEMPERATURE: 97.5 F | SYSTOLIC BLOOD PRESSURE: 118 MMHG | RESPIRATION RATE: 18 BRPM | HEIGHT: 64 IN | HEART RATE: 91 BPM

## 2020-07-06 DIAGNOSIS — N39.41 URGE INCONTINENCE: Primary | ICD-10-CM

## 2020-07-06 DIAGNOSIS — L98.9 SKIN LESION: ICD-10-CM

## 2020-07-06 PROBLEM — N64.4 BREAST PAIN: Status: RESOLVED | Noted: 2019-08-06 | Resolved: 2020-07-06

## 2020-07-06 PROBLEM — N63.11 BREAST LUMP ON RIGHT SIDE AT 11 O'CLOCK POSITION: Status: RESOLVED | Noted: 2019-08-06 | Resolved: 2020-07-06

## 2020-07-06 PROBLEM — R20.0 NUMBNESS: Status: RESOLVED | Noted: 2019-08-06 | Resolved: 2020-07-06

## 2020-07-06 LAB
BILIRUB BLD-MCNC: NEGATIVE MG/DL
CLARITY, POC: CLEAR
COLOR UR: YELLOW
GLUCOSE UR STRIP-MCNC: NEGATIVE MG/DL
KETONES UR QL: NEGATIVE
LEUKOCYTE EST, POC: NEGATIVE
NITRITE UR-MCNC: NEGATIVE MG/ML
PH UR: 6 [PH] (ref 5–8)
PROT UR STRIP-MCNC: NEGATIVE MG/DL
RBC # UR STRIP: ABNORMAL /UL
SP GR UR: 1.02 (ref 1–1.03)
UROBILINOGEN UR QL: NORMAL

## 2020-07-06 PROCEDURE — 81002 URINALYSIS NONAUTO W/O SCOPE: CPT | Performed by: FAMILY MEDICINE

## 2020-07-06 PROCEDURE — 99213 OFFICE O/P EST LOW 20 MIN: CPT | Performed by: FAMILY MEDICINE

## 2020-07-06 PROCEDURE — 87086 URINE CULTURE/COLONY COUNT: CPT | Performed by: FAMILY MEDICINE

## 2020-07-06 RX ORDER — VENLAFAXINE 37.5 MG/1
TABLET ORAL
COMMUNITY
Start: 2020-04-24 | End: 2020-07-06

## 2020-07-06 RX ORDER — VENLAFAXINE HYDROCHLORIDE 150 MG/1
CAPSULE, EXTENDED RELEASE ORAL
COMMUNITY
Start: 2020-06-17

## 2020-07-06 NOTE — PROGRESS NOTES
Victoriano Weiss is a 44 y.o. female.     History of Present Illness   Has leakage after urination and after intercourse. Has worsened over a few months. Also has a rash in genitalia, using A&D ointment with relief. Due to see GYN. Has 5 children, vaginal deliveries. Also c/o bleeding after intercourse. Periods regular.  Denies urinary leaking with sneeze, cough, jumping, or running.    Would like referred to derm. Has a skin lesion on left forearm.  The following portions of the patient's history were reviewed and updated as appropriate: allergies, current medications, past family history, past medical history, past social history, past surgical history and problem list.  Vitals:    07/06/20 0957   BP: 118/86   Pulse: 91   Resp: 18   Temp: 97.5 °F (36.4 °C)   SpO2: 99%     Body mass index is 28.67 kg/m².  Review of Systems   Constitutional: Negative.  Negative for activity change, fatigue, fever, unexpected weight gain and unexpected weight loss.   HENT: Negative.  Negative for congestion, sneezing and sore throat.    Eyes: Negative.  Negative for blurred vision, double vision and visual disturbance.   Respiratory: Negative.  Negative for cough, chest tightness, shortness of breath and wheezing.    Cardiovascular: Negative.  Negative for chest pain, palpitations and leg swelling.   Gastrointestinal: Negative.  Negative for abdominal distention, abdominal pain, blood in stool, constipation, diarrhea and nausea.   Endocrine: Negative.  Negative for cold intolerance and heat intolerance.   Genitourinary: Positive for urinary incontinence. Negative for dysuria, frequency and urgency.   Musculoskeletal: Negative.  Negative for arthralgias and myalgias.   Skin: Negative.  Negative for rash.   Allergic/Immunologic: Negative.    Neurological: Negative.  Negative for dizziness, syncope, numbness and memory problem.   Hematological: Negative.  Negative for adenopathy.   Psychiatric/Behavioral: Negative.  Negative  for suicidal ideas and depressed mood. The patient is not nervous/anxious.    All other systems reviewed and are negative.      Objective   Physical Exam   Constitutional: She is oriented to person, place, and time. She appears well-developed and well-nourished. No distress.   HENT:   Right Ear: External ear normal.   Left Ear: External ear normal.   Nose: Nose normal.   Mouth/Throat: Oropharynx is clear and moist.   Eyes: Pupils are equal, round, and reactive to light. Conjunctivae and EOM are normal.   Neck: Normal range of motion. Neck supple. No thyromegaly present.   Cardiovascular: Normal rate, regular rhythm and normal heart sounds.   No murmur heard.  Pulmonary/Chest: Effort normal and breath sounds normal. No respiratory distress. She has no wheezes.   Abdominal: Soft. Bowel sounds are normal. She exhibits no distension and no mass. There is no tenderness. There is no rebound and no guarding. No hernia.   Musculoskeletal: Normal range of motion. She exhibits no edema or tenderness.   Lymphadenopathy:     She has no cervical adenopathy.   Neurological: She is alert and oriented to person, place, and time. She has normal reflexes.   Skin: Skin is warm and dry. No rash noted. She is not diaphoretic. No erythema. No pallor.   Psychiatric: She has a normal mood and affect. Her behavior is normal. Judgment and thought content normal.   Nursing note and vitals reviewed.          Assessment/Plan   Reyna was seen today for urinary incontinence.    Diagnoses and all orders for this visit:    Urge incontinence  -     POC Urinalysis Dipstick  -     Ambulatory Referral to Obstetrics / Gynecology  -     Urine Culture - Urine, Urine, Clean Catch    Skin lesion  -     Ambulatory Referral to Dermatology

## 2020-07-07 ENCOUNTER — OFFICE VISIT (OUTPATIENT)
Dept: OBSTETRICS AND GYNECOLOGY | Facility: CLINIC | Age: 45
End: 2020-07-07

## 2020-07-07 VITALS
BODY MASS INDEX: 28.41 KG/M2 | DIASTOLIC BLOOD PRESSURE: 80 MMHG | HEIGHT: 64 IN | SYSTOLIC BLOOD PRESSURE: 126 MMHG | WEIGHT: 166.4 LBS

## 2020-07-07 DIAGNOSIS — N76.3 CHRONIC VULVITIS: ICD-10-CM

## 2020-07-07 DIAGNOSIS — N81.6 CYSTOCELE WITH RECTOCELE: ICD-10-CM

## 2020-07-07 DIAGNOSIS — N92.6 PREMENSTRUAL SPOTTING: ICD-10-CM

## 2020-07-07 DIAGNOSIS — N81.10 CYSTOCELE WITH RECTOCELE: ICD-10-CM

## 2020-07-07 DIAGNOSIS — N39.46 MIXED STRESS AND URGE URINARY INCONTINENCE: ICD-10-CM

## 2020-07-07 DIAGNOSIS — Z01.411 ENCOUNTER FOR GYNECOLOGICAL EXAMINATION WITH ABNORMAL FINDING: Primary | ICD-10-CM

## 2020-07-07 LAB — BACTERIA SPEC AEROBE CULT: NO GROWTH

## 2020-07-07 PROCEDURE — 99386 PREV VISIT NEW AGE 40-64: CPT | Performed by: OBSTETRICS & GYNECOLOGY

## 2020-07-07 NOTE — PROGRESS NOTES
"   Chief Complaint   Patient presents with   • Annual Exam     New GYN, establish care.  c/o post-coital bleeding. c/o \"rash\" in vaginal area.   • Urinary Incontinence   • Menstrual Problem     patient states that she has brown spotting X 7 days prior to menses.       Reyna Weiss is a 44 y.o. year old  presenting to be seen for her annual exam.  This is her first gynecologic visit with me.  This patient has had 5 vaginal deliveries.  She has developed symptoms of pelvic pressure and has occasional urinary incontinence.  She does not feel that this is primarily stress-related.  She does not have frequent cystitis.  She does not have hematuria.  She has developed difficulty expelling stool.  She has to manipulate to do so.  She is noted some bulging from the vagina.  In the past 6 months she has developed premenstrual spotting and postcoital spotting.  She has no symptoms of vaginal infection.  She has never been diagnosed with cervicitis.  She had a suspicious mammogram in 2019.  She is being followed by Dr. Charmaine Mares and will have [facility] in 2020.  She is treated for ADD and depression with Adderall and venlafaxine.    SCREENING TESTS    Year 2012 2014 2015 2016 2017 2018 2019 2020  2024 2025 2026 2027 2028 2029 2030 2031 2032 2033   Age                         PAP                         HPV high risk                         Mammogram        suspicioius                 LEISA score                         Breast MRI                         Lipids                         Vitamin D                         Colonoscopy                         DEXA  Frax (hip/any)                         Ovarian Screen                             She exercises regularly: yes.  She wears her seat belt: yes.  She has concerns about domestic violence: no.  She has noticed changes in height: no    GYN screening history:  · Last mammogram: was done on approximately 2019 and the result " was: Birads IV (Suspicous abnormality)..    No Additional Complaints Reported    The following portions of the patient's history were reviewed and updated as appropriate:vital signs and   She  has a past medical history of ADHD, Depression, Fibroid, GERD (gastroesophageal reflux disease), Hypertension, and Seasonal allergies.  She does not have any pertinent problems on file.  She  has no past surgical history on file.  Her family history includes Breast cancer in her maternal aunt and maternal aunt.  She  reports that she has never smoked. She has never used smokeless tobacco. She reports that she does not drink alcohol or use drugs.  Current Outpatient Medications   Medication Sig Dispense Refill   • amphetamine-dextroamphetamine (ADDERALL) 20 MG tablet Take 1 tablet by mouth 2 (Two) Times a Day. (Patient taking differently: Take 20 mg by mouth Daily.) 60 tablet 0   • amphetamine-dextroamphetamine XR (ADDERALL XR) 20 MG 24 hr capsule Take 20 mg by mouth Every Morning  0   • fluticasone (FLONASE ALLERGY RELIEF) 50 MCG/ACT nasal spray 2 sprays into the nostril(s) as directed by provider Daily. 1 bottle 6   • lisinopril-hydrochlorothiazide (PRINZIDE,ZESTORETIC) 20-12.5 MG per tablet Take 1 tablet by mouth Daily. 90 tablet 1   • montelukast (SINGULAIR) 10 MG tablet Take 1 tablet by mouth Every Night. 30 tablet 6   • omeprazole (priLOSEC) 20 MG capsule TAKE 1 CAPSULE BY MOUTH TWICE DAILY 60 capsule 3   • Sod Picosulfate-Mag Ox-Cit Acd 10-3.5-12 MG-GM -GM/160ML solution Take 1 kit by mouth Take As Directed. Follow instructions that were mailed to your home. If you didn't receive these call [phone]. 2 bottle 0   • SUMAtriptan (IMITREX) 100 MG tablet TAKE 1 TABLET BY MOUTH AT ONSET MAY REPEAT IN 2 HOURS IF HEADACHE PERSISTS MAXIMUM DAILY DOSE OF 2 TABLETS( 200 MILLIGRAMS) EVERY 24 HOURS 9 tablet 3   • Unable to find Take 1 each by mouth 1 (One) Time. Herbal Laxative     • Unable to find Take 1 each by mouth 1 (One)  "Time. Vegan Omega-3     • venlafaxine XR (EFFEXOR-XR) 150 MG 24 hr capsule TK 1 C PO D       No current facility-administered medications for this visit.      She has No Known Allergies..    Review of Systems  A review of systems was taken.  Constitutional: negative for fever, chills, activity change, appetite change, fatigue and unexpected weight change.  Respiratory: negative  Cardiovascular: negative  Gastrointestinal: positive for constipation  Genitourinary:positive for urinary incontinence  Musculoskeletal:negative  Behavioral/Psych: negative       /80   Ht 162.6 cm (64\")   Wt 75.5 kg (166 lb 6.4 oz)   LMP 06/16/2020 (Exact Date)   Breastfeeding No   BMI 28.56 kg/m²     Physical Exam    General:  alert; cooperative; well developed; well nourished   Skin:  No suspicious lesions seen   Thyroid: normal to inspection and palpation   Lungs:  clear to auscultation bilaterally   Heart:  regular rate and rhythm, S1, S2 normal, no murmur, click, rub or gallop   Breasts:  Examined in supine position  Symmetric without masses or skin dimpling  Nipples normal without inversion, lesions or discharge  There are no palpable axillary nodes  Fibrocystic changes are present both breasts without a discrete mass   Abdomen: soft, non-tender; no masses  no umbilical or inguinal hernias are present  no hepato-splenomegaly   Pelvis: Clinical staff was present for exam  External genitalia:  Erythemtous rash in groin areas  Vaginal:  normal pink mucosa without prolapse or lesions.  Cervix:  normal appearance.  Uterus:  anteverted; ULNS  Adnexa:  normal bimanual exam of the adnexa.  Rectal:  anus visually normal appearing. recto-vaginal exam unremarkable and confirms findings;  Cystocele GRADE 2  Rectocele GRADE 2  Simple uroflowmetry-reveals a PVR of 60 cc of urine, and a Bonne' test with a 25 degree deviation from baseline     Lab Review   No data reviewed    Imaging  Mammogram results         ASSESSMENT  Problems Addressed " this Visit        Digestive    Cystocele with rectocele      Other Visit Diagnoses     Encounter for gynecological examination with abnormal finding    -  Primary    Relevant Orders    Liquid-based Pap Smear, Screening    Mixed stress and urge urinary incontinence        Premenstrual spotting        Relevant Orders    US Non-ob Transvaginal              Substance History:   reports that she has never smoked. She has never used smokeless tobacco.   reports that she does not drink alcohol.   reports that she does not use drugs.    Substance use counseling is not indicated based on patient history.      PLAN    · Medications prescribed this encounter:  No orders of the defined types were placed in this encounter.  · I have advised the patient to lean forward after she thinks she is completed voiding to completely empty the bladder, and to do Kegel's exercises daily I have encouraged her to avoid heavy lifting.  · Pap test done  · Monthly self breast assessment and follow-up with Dr. Mares regarding breast imaging  · Pelvic ultrasound ordered  · Hydrocortisone ointment, 2.5% to the vulva twice a day for 2 weeks and then daily for 2 weeks  · Calcium, 600 mg/ Vit. D, 400 IU daily; regular weight-bearing exercise  · Follow up: 2 week(s) for US  *Please note that portions of this documentation may have been completed with a voice recognition program.  Efforts were made to edit this dictation, but occasional words may have been mistranscribed.       This note was electronically signed.    CHEO Hassan MD  July 7, 2020  09:21

## 2020-07-20 ENCOUNTER — OFFICE VISIT (OUTPATIENT)
Dept: OBSTETRICS AND GYNECOLOGY | Facility: CLINIC | Age: 45
End: 2020-07-20

## 2020-07-20 VITALS
WEIGHT: 166 LBS | SYSTOLIC BLOOD PRESSURE: 140 MMHG | DIASTOLIC BLOOD PRESSURE: 88 MMHG | BODY MASS INDEX: 28.34 KG/M2 | HEIGHT: 64 IN

## 2020-07-20 DIAGNOSIS — N85.2 BULKY OR ENLARGED UTERUS: Primary | ICD-10-CM

## 2020-07-20 DIAGNOSIS — N81.6 CYSTOCELE WITH RECTOCELE: ICD-10-CM

## 2020-07-20 DIAGNOSIS — N81.10 CYSTOCELE WITH RECTOCELE: Primary | ICD-10-CM

## 2020-07-20 DIAGNOSIS — N81.6 CYSTOCELE WITH RECTOCELE: Primary | ICD-10-CM

## 2020-07-20 DIAGNOSIS — N85.2 BULKY OR ENLARGED UTERUS: ICD-10-CM

## 2020-07-20 DIAGNOSIS — N81.10 CYSTOCELE WITH RECTOCELE: ICD-10-CM

## 2020-07-20 PROCEDURE — 99213 OFFICE O/P EST LOW 20 MIN: CPT | Performed by: OBSTETRICS & GYNECOLOGY

## 2020-07-20 NOTE — PROGRESS NOTES
Chief Complaint   Patient presents with   • Results     pelvic ultrasound today       Reyna Weiss is a 45 y.o. year old  presenting to be seen because of follow-up for a pelvic ultrasound.  This patient has had 5 vaginal deliveries.  She presented with symptoms of mixed incontinence.  She had a body test revealing a 25 degree deviation from baseline with no demonstrated incontinence.  Her PVR was 60 mL.  She was advised to empty her bladder more frequently, to lean forward after she completes voiding, and to do Kegel's exercises.  She was also found to have a bulky 8-week size uterus as well as a grade 2 cystocele and a grade 2 rectocele.  She has to manipulate to have bowel movements.  She does have pelvic pressure.  She has a history of PMS symptoms, and has felt hormonally stable only when pregnant.    Social History     Substance and Sexual Activity   Sexual Activity Yes   • Partners: Male   • Birth control/protection: Surgical     SCREENING TESTS    Year 2012   Age                         PAP         ASCUS                HPV high risk         Neg.                Mammogram                         LEISA score                         Breast MRI                         Lipids                         Vitamin D                         Colonoscopy                         DEXA  Frax (hip/any)                         Ovarian Screen                             No Additional Complaints Reported    The following portions of the patient's history were reviewed and updated as appropriate:vital signs and   She  has a past medical history of ADHD, Depression, Fibroid, GERD (gastroesophageal reflux disease), Hypertension, and Seasonal allergies.  She does not have any pertinent problems on file.  She  has a past surgical history that includes Carpal tunnel release (Right).  Her family history includes Breast  cancer in her maternal aunt and maternal aunt.  She  reports that she has never smoked. She has never used smokeless tobacco. She reports that she does not drink alcohol or use drugs.  Current Outpatient Medications   Medication Sig Dispense Refill   • amphetamine-dextroamphetamine (ADDERALL) 20 MG tablet Take 1 tablet by mouth 2 (Two) Times a Day. (Patient taking differently: Take 20 mg by mouth Daily.) 60 tablet 0   • amphetamine-dextroamphetamine XR (ADDERALL XR) 20 MG 24 hr capsule Take 20 mg by mouth Every Morning  0   • fluticasone (FLONASE ALLERGY RELIEF) 50 MCG/ACT nasal spray 2 sprays into the nostril(s) as directed by provider Daily. 1 bottle 6   • hydrocortisone 2.5 % ointment Apply to vulva twice a day for 2 weeks, then daily for 2 weeks 30 g 1   • lisinopril-hydrochlorothiazide (PRINZIDE,ZESTORETIC) 20-12.5 MG per tablet Take 1 tablet by mouth Daily. 90 tablet 1   • montelukast (SINGULAIR) 10 MG tablet Take 1 tablet by mouth Every Night. 30 tablet 6   • omeprazole (priLOSEC) 20 MG capsule TAKE 1 CAPSULE BY MOUTH TWICE DAILY 60 capsule 3   • Sod Picosulfate-Mag Ox-Cit Acd 10-3.5-12 MG-GM -GM/160ML solution Take 1 kit by mouth Take As Directed. Follow instructions that were mailed to your home. If you didn't receive these call [phone]. 2 bottle 0   • SUMAtriptan (IMITREX) 100 MG tablet TAKE 1 TABLET BY MOUTH AT ONSET MAY REPEAT IN 2 HOURS IF HEADACHE PERSISTS MAXIMUM DAILY DOSE OF 2 TABLETS( 200 MILLIGRAMS) EVERY 24 HOURS 9 tablet 3   • Unable to find Take 1 each by mouth 1 (One) Time. Herbal Laxative     • Unable to find Take 1 each by mouth 1 (One) Time. Vegan Omega-3     • venlafaxine XR (EFFEXOR-XR) 150 MG 24 hr capsule TK 1 C PO D       No current facility-administered medications for this visit.      She has No Known Allergies..        Review of Systems  A review of systems was done.  Constitutional: negative for fever, chills, activity change, appetite change, fatigue and unexpected weight  "change.  Respiratory: negative  Cardiovascular: negative  Gastrointestinal: Has to manipulate to have bowel movements  Genitourinary:positive for urinary incontinence and pelvic pressure  Musculoskeletal:negative  Behavioral/Psych: positive for mood swings          /88   Ht 162.6 cm (64\")   Wt 75.3 kg (166 lb)   LMP 07/12/2020 (Exact Date)   Breastfeeding No   BMI 28.49 kg/m²     Physical Exam    General:  alert; cooperative; well developed; well nourished   Skin:  No suspicious lesions seen   Thyroid: normal to inspection and palpation   Lungs:  clear to auscultation bilaterally   Heart:  regular rate and rhythm, S1, S2 normal, no murmur, click, rub or gallop   Breasts:  Not performed.   Abdomen: soft, non-tender; no masses  no umbilical or inguinal hernias are present  no hepato-splenomegaly   Pelvis: Clinical staff was present for exam  External genitalia:  normal appearance of the external genitalia including Bartholin's and [place]'s glands.  Vaginal:  normal pink mucosa without prolapse or lesions.  Cervix:  normal appearance.  Uterus:  symmetrically enlarged, consisent with 8 weeks size;  Adnexa:  normal bimanual exam of the adnexa.     Lab Review   No data reviewed    Imaging   Mammogram results    Medical counseling was given to patient for the following topics: diagnostic results, instructions for management, risk factor reductions, prognosis, impressions and risks and benefits of treatment options . Total time of the encounter was 22 minute(s) and 20 minute(s)  was spent in face to face counseling.  I have reviewed the patient's clinical symptoms of mixed incontinence as well as pelvic pressure and having to manipulate to have bowel movements.  I have reviewed with the patient that she has a bulky, enlarged uterus and she has a grade 2 cystocele and a grade 2 rectocele.  I have reviewed the ultrasound findings with her indicating a uterus measuring 150 cm³ with an endometrial stripe of 9.6 mm.  I " have advised her that her ovaries are normal.  The patient desires definitive surgical intervention.  I have recommended that she undergo a laparoscopically assisted vaginal hysterectomy/bilateral salpingo-oophorectomy/vaginal cuff suspension to uterosacral ligaments along with an anterior-posterior repair.  I have explained to her that the repairs would be done with suture correction.  I have reviewed with her the surgical risks of bowel, bladder, ureteral injury; urinary retention; bleeding, infection, and anesthetic risks.  She has been given pamphlets about her diagnoses and surgical intervention.  She desires to be scheduled.  She will discuss all of this information with her .          ASSESSMENT  Problems Addressed this Visit        Digestive    Cystocele with rectocele - Primary       Genitourinary    Bulky or enlarged uterus           Substance History:    reports that she has never smoked. She has never used smokeless tobacco.    reports that she does not drink alcohol.    reports that she does not use drugs.    Substance use counseling is not indicated based on patient history.      PLAN    · Medications prescribed this encounter:  No orders of the defined types were placed in this encounter.  · Pamphlets about uterine enlargement, pelvic organ prolapse, laparoscopic hysterectomy, and anterior/posterior repair were given to the patient  · Follow up: 10 week(s) for her preoperative visit  · Calcium, 600 mg/ Vit. D, 400 IU daily, regular, weight-bearing exercise 4 days a week  · Kegel's exercises daily, avoid heavy lifting and straining     *Please note that portions of this documentation may have been completed with a voice recognition program.  Efforts were made to edit this dictation, but occasional words may have been mistranscribed.     This note was electronically signed.    CHEO Hassan MD  July 20, 2020  14:43

## 2020-08-04 DIAGNOSIS — I10 ESSENTIAL HYPERTENSION: ICD-10-CM

## 2020-08-05 RX ORDER — LISINOPRIL AND HYDROCHLOROTHIAZIDE 20; 12.5 MG/1; MG/1
1 TABLET ORAL DAILY
Qty: 90 TABLET | Refills: 1 | Status: SHIPPED | OUTPATIENT
Start: 2020-08-05

## 2020-08-26 ENCOUNTER — APPOINTMENT (OUTPATIENT)
Dept: MAMMOGRAPHY | Facility: HOSPITAL | Age: 45
End: 2020-08-26

## 2020-09-21 ENCOUNTER — OFFICE VISIT (OUTPATIENT)
Dept: FAMILY MEDICINE CLINIC | Facility: CLINIC | Age: 45
End: 2020-09-21

## 2020-09-21 VITALS
BODY MASS INDEX: 29.02 KG/M2 | TEMPERATURE: 97.8 F | RESPIRATION RATE: 16 BRPM | DIASTOLIC BLOOD PRESSURE: 83 MMHG | OXYGEN SATURATION: 99 % | HEART RATE: 93 BPM | HEIGHT: 64 IN | SYSTOLIC BLOOD PRESSURE: 129 MMHG | WEIGHT: 170 LBS

## 2020-09-21 DIAGNOSIS — R68.82 DECREASED LIBIDO: ICD-10-CM

## 2020-09-21 DIAGNOSIS — L60.8 CHANGE IN NAIL APPEARANCE: ICD-10-CM

## 2020-09-21 DIAGNOSIS — L98.9 SKIN LESION: ICD-10-CM

## 2020-09-21 DIAGNOSIS — K21.9 GASTROESOPHAGEAL REFLUX DISEASE WITHOUT ESOPHAGITIS: ICD-10-CM

## 2020-09-21 DIAGNOSIS — I10 ESSENTIAL HYPERTENSION: Primary | ICD-10-CM

## 2020-09-21 LAB
25(OH)D3 SERPL-MCNC: 33.8 NG/ML (ref 30–100)
ALBUMIN SERPL-MCNC: 4.2 G/DL (ref 3.5–5.2)
ALBUMIN/GLOB SERPL: 1.7 G/DL
ALP SERPL-CCNC: 67 U/L (ref 39–117)
ALT SERPL W P-5'-P-CCNC: 27 U/L (ref 1–33)
ANION GAP SERPL CALCULATED.3IONS-SCNC: 9.3 MMOL/L (ref 5–15)
AST SERPL-CCNC: 49 U/L (ref 1–32)
BASOPHILS # BLD AUTO: 0.07 10*3/MM3 (ref 0–0.2)
BASOPHILS NFR BLD AUTO: 1.1 % (ref 0–1.5)
BILIRUB SERPL-MCNC: 0.2 MG/DL (ref 0–1.2)
BUN SERPL-MCNC: 8 MG/DL (ref 6–20)
BUN/CREAT SERPL: 11.3 (ref 7–25)
CALCIUM SPEC-SCNC: 9.1 MG/DL (ref 8.6–10.5)
CHLORIDE SERPL-SCNC: 105 MMOL/L (ref 98–107)
CHOLEST SERPL-MCNC: 156 MG/DL (ref 0–200)
CO2 SERPL-SCNC: 25.7 MMOL/L (ref 22–29)
CREAT SERPL-MCNC: 0.71 MG/DL (ref 0.57–1)
DEPRECATED RDW RBC AUTO: 41.7 FL (ref 37–54)
EOSINOPHIL # BLD AUTO: 0.37 10*3/MM3 (ref 0–0.4)
EOSINOPHIL NFR BLD AUTO: 5.7 % (ref 0.3–6.2)
ERYTHROCYTE [DISTWIDTH] IN BLOOD BY AUTOMATED COUNT: 13.4 % (ref 12.3–15.4)
FSH SERPL-ACNC: 5.48 MIU/ML
GFR SERPL CREATININE-BSD FRML MDRD: 88 ML/MIN/1.73
GLOBULIN UR ELPH-MCNC: 2.5 GM/DL
GLUCOSE SERPL-MCNC: 98 MG/DL (ref 65–99)
HCT VFR BLD AUTO: 37 % (ref 34–46.6)
HDLC SERPL-MCNC: 51 MG/DL (ref 40–60)
HGB BLD-MCNC: 11.7 G/DL (ref 12–15.9)
IMM GRANULOCYTES # BLD AUTO: 0.03 10*3/MM3 (ref 0–0.05)
IMM GRANULOCYTES NFR BLD AUTO: 0.5 % (ref 0–0.5)
IRON 24H UR-MRATE: 36 MCG/DL (ref 37–145)
LDLC SERPL CALC-MCNC: 85 MG/DL (ref 0–100)
LDLC/HDLC SERPL: 1.66 {RATIO}
LYMPHOCYTES # BLD AUTO: 1.78 10*3/MM3 (ref 0.7–3.1)
LYMPHOCYTES NFR BLD AUTO: 27.6 % (ref 19.6–45.3)
MCH RBC QN AUTO: 27.5 PG (ref 26.6–33)
MCHC RBC AUTO-ENTMCNC: 31.6 G/DL (ref 31.5–35.7)
MCV RBC AUTO: 86.9 FL (ref 79–97)
MONOCYTES # BLD AUTO: 0.66 10*3/MM3 (ref 0.1–0.9)
MONOCYTES NFR BLD AUTO: 10.2 % (ref 5–12)
NEUTROPHILS NFR BLD AUTO: 3.54 10*3/MM3 (ref 1.7–7)
NEUTROPHILS NFR BLD AUTO: 54.9 % (ref 42.7–76)
NRBC BLD AUTO-RTO: 0 /100 WBC (ref 0–0.2)
PLATELET # BLD AUTO: 250 10*3/MM3 (ref 140–450)
PMV BLD AUTO: 10.3 FL (ref 6–12)
POTASSIUM SERPL-SCNC: 5.1 MMOL/L (ref 3.5–5.2)
PROT SERPL-MCNC: 6.7 G/DL (ref 6–8.5)
RBC # BLD AUTO: 4.26 10*6/MM3 (ref 3.77–5.28)
SODIUM SERPL-SCNC: 140 MMOL/L (ref 136–145)
TESTOST SERPL-MCNC: 10.3 NG/DL (ref 8.4–48.1)
TRIGL SERPL-MCNC: 102 MG/DL (ref 0–150)
TSH SERPL DL<=0.05 MIU/L-ACNC: 1.51 UIU/ML (ref 0.27–4.2)
VIT B12 BLD-MCNC: 667 PG/ML (ref 211–946)
VLDLC SERPL-MCNC: 20.4 MG/DL (ref 5–40)
WBC # BLD AUTO: 6.45 10*3/MM3 (ref 3.4–10.8)

## 2020-09-21 PROCEDURE — 82306 VITAMIN D 25 HYDROXY: CPT | Performed by: FAMILY MEDICINE

## 2020-09-21 PROCEDURE — 84403 ASSAY OF TOTAL TESTOSTERONE: CPT | Performed by: FAMILY MEDICINE

## 2020-09-21 PROCEDURE — 82607 VITAMIN B-12: CPT | Performed by: FAMILY MEDICINE

## 2020-09-21 PROCEDURE — 99214 OFFICE O/P EST MOD 30 MIN: CPT | Performed by: FAMILY MEDICINE

## 2020-09-21 PROCEDURE — 90686 IIV4 VACC NO PRSV 0.5 ML IM: CPT | Performed by: FAMILY MEDICINE

## 2020-09-21 PROCEDURE — 83540 ASSAY OF IRON: CPT | Performed by: FAMILY MEDICINE

## 2020-09-21 PROCEDURE — 80053 COMPREHEN METABOLIC PANEL: CPT | Performed by: FAMILY MEDICINE

## 2020-09-21 PROCEDURE — 84443 ASSAY THYROID STIM HORMONE: CPT | Performed by: FAMILY MEDICINE

## 2020-09-21 PROCEDURE — 85025 COMPLETE CBC W/AUTO DIFF WBC: CPT | Performed by: FAMILY MEDICINE

## 2020-09-21 PROCEDURE — 90471 IMMUNIZATION ADMIN: CPT | Performed by: FAMILY MEDICINE

## 2020-09-21 PROCEDURE — 80061 LIPID PANEL: CPT | Performed by: FAMILY MEDICINE

## 2020-09-21 PROCEDURE — 83001 ASSAY OF GONADOTROPIN (FSH): CPT | Performed by: FAMILY MEDICINE

## 2020-09-21 PROCEDURE — 84402 ASSAY OF FREE TESTOSTERONE: CPT | Performed by: FAMILY MEDICINE

## 2020-09-21 NOTE — PROGRESS NOTES
Victoriano Weiss is a 45 y.o. female.      Pt is concerned about nails breaking. Tongue is raw. Pt having craving cleaning solutions, mouthwash and gum chewing, has had in the past when iron levels are low. Due for labs and concerned about anemia. Pt has regular periods, slightly heavy. Pt is plant based. Pt takes B12 and MVI.  Hypertension  This is a chronic problem. The current episode started more than 1 year ago. The problem is unchanged. The problem is controlled. Associated symptoms include malaise/fatigue. Pertinent negatives include no blurred vision, chest pain, palpitations, peripheral edema or shortness of breath. There are no associated agents to hypertension. Risk factors for coronary artery disease include family history. Past treatments include ACE inhibitors and diuretics. Current antihypertension treatment includes diuretics and ACE inhibitors. The current treatment provides significant improvement. There are no compliance problems.    Heartburn  She complains of heartburn. She reports no abdominal pain, no chest pain, no coughing, no nausea, no sore throat or no wheezing. This is a chronic problem. The current episode started more than 1 month ago. The problem occurs occasionally. The problem has been unchanged. The heartburn is of mild intensity. The heartburn does not wake her from sleep. The heartburn does not limit her activity. The heartburn changes with position. Nothing aggravates the symptoms. Pertinent negatives include no fatigue or weight loss. There are no known risk factors. She has tried a PPI for the symptoms. The treatment provided moderate relief. Past procedures include an EGD.   Had EGD 2020. Mother had had gastric cancer.  Would like to see derm due to new skin lesions.      The following portions of the patient's history were reviewed and updated as appropriate: allergies, current medications, past family history, past medical history, past social history, past  surgical history and problem list.  Vitals:    09/21/20 0946   BP: 129/83   Pulse: 93   Resp: 16   Temp: 97.8 °F (36.6 °C)   SpO2: 99%     Body mass index is 29.18 kg/m².  Review of Systems   Constitutional: Positive for malaise/fatigue. Negative for activity change, fatigue, fever, unexpected weight gain and unexpected weight loss.   HENT: Negative.  Negative for congestion, sneezing and sore throat.    Eyes: Negative.  Negative for blurred vision, double vision and visual disturbance.   Respiratory: Negative.  Negative for cough, chest tightness, shortness of breath and wheezing.    Cardiovascular: Negative.  Negative for chest pain, palpitations and leg swelling.   Gastrointestinal: Negative for abdominal distention, abdominal pain, blood in stool, constipation, diarrhea and nausea.   Endocrine: Negative.  Negative for cold intolerance and heat intolerance.   Genitourinary: Negative.  Negative for dysuria, frequency, urgency and urinary incontinence.   Musculoskeletal: Negative.  Negative for arthralgias and myalgias.   Skin: Negative.  Negative for rash.   Allergic/Immunologic: Negative.    Neurological: Negative.  Negative for dizziness, syncope, numbness and memory problem.   Hematological: Negative.  Negative for adenopathy.   Psychiatric/Behavioral: Negative.  Negative for suicidal ideas and depressed mood. The patient is not nervous/anxious.    All other systems reviewed and are negative.      Objective   Physical Exam  Vitals signs and nursing note reviewed.   Constitutional:       General: She is not in acute distress.     Appearance: She is well-developed. She is not diaphoretic.   HENT:      Right Ear: External ear normal.      Left Ear: External ear normal.      Nose: Nose normal.   Eyes:      Conjunctiva/sclera: Conjunctivae normal.      Pupils: Pupils are equal, round, and reactive to light.   Neck:      Musculoskeletal: Normal range of motion and neck supple.      Thyroid: No thyromegaly.    Cardiovascular:      Rate and Rhythm: Normal rate and regular rhythm.      Heart sounds: Normal heart sounds. No murmur.   Pulmonary:      Effort: Pulmonary effort is normal. No respiratory distress.      Breath sounds: Normal breath sounds. No wheezing.   Abdominal:      General: Bowel sounds are normal. There is no distension.      Palpations: Abdomen is soft. There is no mass.      Tenderness: There is no abdominal tenderness. There is no guarding or rebound.      Hernia: No hernia is present.   Musculoskeletal: Normal range of motion.         General: No tenderness.   Lymphadenopathy:      Cervical: No cervical adenopathy.   Skin:     General: Skin is warm and dry.      Coloration: Skin is not pale.      Findings: No erythema or rash.   Neurological:      Mental Status: She is alert and oriented to person, place, and time.      Deep Tendon Reflexes: Reflexes are normal and symmetric.   Psychiatric:         Behavior: Behavior normal.         Thought Content: Thought content normal.         Judgment: Judgment normal.             Assessment/Plan   Reyna was seen today for anemia.    Diagnoses and all orders for this visit:    Essential hypertension  -     CBC & Differential  -     Comprehensive Metabolic Panel  -     Lipid Panel  -     TSH  -     CBC Auto Differential    Gastroesophageal reflux disease without esophagitis    Change in nail appearance  -     Vitamin B12  -     Vitamin D 25 Hydroxy  -     Iron    Decreased libido  -     Follicle Stimulating Hormone  -     Testosterone  -     Testosterone, Free, Total    Skin lesion  -     Ambulatory Referral to Dermatology    Other orders  -     Fluarix Quad >6 Months ([id number])    Continue current meds.

## 2020-09-24 LAB
TESTOST FREE SERPL-MCNC: 1.1 PG/ML (ref 0–4.2)
TESTOST SERPL-MCNC: 11 NG/DL (ref 8–48)

## 2020-10-22 DIAGNOSIS — K21.00 GASTROESOPHAGEAL REFLUX DISEASE WITH ESOPHAGITIS: ICD-10-CM

## 2020-10-22 RX ORDER — OMEPRAZOLE 20 MG/1
CAPSULE, DELAYED RELEASE ORAL
Qty: 60 CAPSULE | Refills: 3 | Status: SHIPPED | OUTPATIENT
Start: 2020-10-22 | End: 2021-03-03

## 2020-11-24 DIAGNOSIS — R51.9 NONINTRACTABLE EPISODIC HEADACHE, UNSPECIFIED HEADACHE TYPE: ICD-10-CM

## 2020-11-29 RX ORDER — SUMATRIPTAN 100 MG/1
TABLET, FILM COATED ORAL
Qty: 9 TABLET | Refills: 3 | Status: SHIPPED | OUTPATIENT
Start: 2020-11-29

## 2021-03-02 DIAGNOSIS — K21.00 GASTROESOPHAGEAL REFLUX DISEASE WITH ESOPHAGITIS: ICD-10-CM

## 2021-03-03 RX ORDER — OMEPRAZOLE 20 MG/1
CAPSULE, DELAYED RELEASE ORAL
Qty: 60 CAPSULE | Refills: 3 | Status: SHIPPED | OUTPATIENT
Start: 2021-03-03

## 2021-03-03 RX ORDER — OMEPRAZOLE 20 MG/1
20 CAPSULE, DELAYED RELEASE ORAL 2 TIMES DAILY
Qty: 60 CAPSULE | Refills: 3 | Status: SHIPPED | OUTPATIENT
Start: 2021-03-03

## 2021-03-29 DIAGNOSIS — R51.9 NONINTRACTABLE EPISODIC HEADACHE, UNSPECIFIED HEADACHE TYPE: ICD-10-CM

## 2021-03-29 RX ORDER — SUMATRIPTAN 100 MG/1
TABLET, FILM COATED ORAL
Qty: 9 TABLET | Refills: 3 | OUTPATIENT
Start: 2021-03-29

## 2021-07-19 DIAGNOSIS — R51.9 NONINTRACTABLE EPISODIC HEADACHE, UNSPECIFIED HEADACHE TYPE: ICD-10-CM

## 2021-07-19 RX ORDER — SUMATRIPTAN 100 MG/1
TABLET, FILM COATED ORAL
Qty: 9 TABLET | Refills: 3 | OUTPATIENT
Start: 2021-07-19